# Patient Record
Sex: FEMALE | Race: WHITE | NOT HISPANIC OR LATINO | Employment: OTHER | URBAN - METROPOLITAN AREA
[De-identification: names, ages, dates, MRNs, and addresses within clinical notes are randomized per-mention and may not be internally consistent; named-entity substitution may affect disease eponyms.]

---

## 2020-10-02 LAB
CHOLESTEROL (EXTERNAL): 185 MG/DL
HDLC SERPL-MCNC: 61 MG/DL (ref 40–60)
NON HDL CHOLESTEROL (EXTERNAL): 124 MG/DL
TRIGLYCERIDES (EXTERNAL): 73 MG/DL

## 2021-03-25 LAB
ALT SERPL-CCNC: 23 U/L
AST SERPL-CCNC: 28 IU/L (ref 15–52)
CREATININE (EXTERNAL): 0.79 MG/DL (ref 0.52–1.04)
GFR ESTIMATED (EXTERNAL): >60 ML/MIN/1.73M2
GFR ESTIMATED (IF AFRICAN AMERICAN) (EXTERNAL): >60 ML/MIN/1.73M2
GLUCOSE (EXTERNAL): 110 MG/DL (ref 65–100)
HBA1C MFR BLD: 5.4 %
TSH SERPL-ACNC: 2.4 UIU/ML (ref 0.47–4.68)

## 2021-11-14 DIAGNOSIS — Z11.59 ENCOUNTER FOR SCREENING FOR OTHER VIRAL DISEASES: ICD-10-CM

## 2021-11-30 ENCOUNTER — TRANSFERRED RECORDS (OUTPATIENT)
Dept: MULTI SPECIALTY CLINIC | Facility: CLINIC | Age: 76
End: 2021-11-30
Payer: COMMERCIAL

## 2021-11-30 LAB — POTASSIUM (EXTERNAL): 4.1 MMOL/L (ref 3.5–5.1)

## 2021-12-05 ENCOUNTER — LAB (OUTPATIENT)
Dept: FAMILY MEDICINE | Facility: CLINIC | Age: 76
End: 2021-12-05
Attending: ORTHOPAEDIC SURGERY
Payer: COMMERCIAL

## 2021-12-05 DIAGNOSIS — Z11.59 ENCOUNTER FOR SCREENING FOR OTHER VIRAL DISEASES: ICD-10-CM

## 2021-12-05 PROCEDURE — U0003 INFECTIOUS AGENT DETECTION BY NUCLEIC ACID (DNA OR RNA); SEVERE ACUTE RESPIRATORY SYNDROME CORONAVIRUS 2 (SARS-COV-2) (CORONAVIRUS DISEASE [COVID-19]), AMPLIFIED PROBE TECHNIQUE, MAKING USE OF HIGH THROUGHPUT TECHNOLOGIES AS DESCRIBED BY CMS-2020-01-R: HCPCS

## 2021-12-05 PROCEDURE — U0005 INFEC AGEN DETEC AMPLI PROBE: HCPCS

## 2021-12-06 LAB — SARS-COV-2 RNA RESP QL NAA+PROBE: NEGATIVE

## 2021-12-07 RX ORDER — DULOXETIN HYDROCHLORIDE 30 MG/1
90 CAPSULE, DELAYED RELEASE ORAL DAILY
COMMUNITY

## 2021-12-07 RX ORDER — CARBIDOPA AND LEVODOPA 25; 100 MG/1; MG/1
1 TABLET ORAL 3 TIMES DAILY
COMMUNITY

## 2021-12-07 RX ORDER — NICOTINE POLACRILEX 4 MG/1
20 GUM, CHEWING ORAL DAILY
Status: ON HOLD | COMMUNITY
End: 2021-12-09

## 2021-12-07 RX ORDER — LOSARTAN POTASSIUM 25 MG/1
12.5 TABLET ORAL DAILY
COMMUNITY

## 2021-12-08 ENCOUNTER — ANESTHESIA EVENT (OUTPATIENT)
Dept: SURGERY | Facility: CLINIC | Age: 76
End: 2021-12-08
Payer: MEDICARE

## 2021-12-09 ENCOUNTER — ANESTHESIA (OUTPATIENT)
Dept: SURGERY | Facility: CLINIC | Age: 76
End: 2021-12-09
Payer: MEDICARE

## 2021-12-09 ENCOUNTER — APPOINTMENT (OUTPATIENT)
Dept: RADIOLOGY | Facility: CLINIC | Age: 76
End: 2021-12-09
Attending: ORTHOPAEDIC SURGERY
Payer: MEDICARE

## 2021-12-09 ENCOUNTER — HOSPITAL ENCOUNTER (OUTPATIENT)
Facility: CLINIC | Age: 76
Discharge: HOME | End: 2021-12-09
Attending: ORTHOPAEDIC SURGERY | Admitting: ORTHOPAEDIC SURGERY
Payer: MEDICARE

## 2021-12-09 VITALS
HEART RATE: 88 BPM | WEIGHT: 215.1 LBS | BODY MASS INDEX: 45.15 KG/M2 | OXYGEN SATURATION: 95 % | TEMPERATURE: 97.8 F | RESPIRATION RATE: 14 BRPM | SYSTOLIC BLOOD PRESSURE: 130 MMHG | HEIGHT: 58 IN | DIASTOLIC BLOOD PRESSURE: 58 MMHG

## 2021-12-09 DIAGNOSIS — Z98.890 STATUS POST LUMBAR SPINE OPERATION: Primary | ICD-10-CM

## 2021-12-09 LAB — GLUCOSE BLDC GLUCOMTR-MCNC: 93 MG/DL (ref 70–99)

## 2021-12-09 PROCEDURE — 710N000010 HC RECOVERY PHASE 1, LEVEL 2, PER MIN: Performed by: ORTHOPAEDIC SURGERY

## 2021-12-09 PROCEDURE — 250N000009 HC RX 250: Performed by: NURSE ANESTHETIST, CERTIFIED REGISTERED

## 2021-12-09 PROCEDURE — 250N000005 HC OR RX SURGIFLO HEMOSTATIC MATRIX 10ML 199102S OPNP: Performed by: ORTHOPAEDIC SURGERY

## 2021-12-09 PROCEDURE — 272N000001 HC OR GENERAL SUPPLY STERILE: Performed by: ORTHOPAEDIC SURGERY

## 2021-12-09 PROCEDURE — 360N000084 HC SURGERY LEVEL 4 W/ FLUORO, PER MIN: Performed by: ORTHOPAEDIC SURGERY

## 2021-12-09 PROCEDURE — 250N000013 HC RX MED GY IP 250 OP 250 PS 637: Performed by: ANESTHESIOLOGY

## 2021-12-09 PROCEDURE — 82962 GLUCOSE BLOOD TEST: CPT | Mod: GZ

## 2021-12-09 PROCEDURE — 250N000009 HC RX 250: Performed by: ORTHOPAEDIC SURGERY

## 2021-12-09 PROCEDURE — 710N000012 HC RECOVERY PHASE 2, PER MINUTE: Performed by: ORTHOPAEDIC SURGERY

## 2021-12-09 PROCEDURE — 272N000004 HC RX 272: Performed by: ORTHOPAEDIC SURGERY

## 2021-12-09 PROCEDURE — 250N000011 HC RX IP 250 OP 636: Performed by: NURSE ANESTHETIST, CERTIFIED REGISTERED

## 2021-12-09 PROCEDURE — 250N000011 HC RX IP 250 OP 636: Performed by: ORTHOPAEDIC SURGERY

## 2021-12-09 PROCEDURE — 250N000011 HC RX IP 250 OP 636: Performed by: ANESTHESIOLOGY

## 2021-12-09 PROCEDURE — 250N000013 HC RX MED GY IP 250 OP 250 PS 637: Performed by: ORTHOPAEDIC SURGERY

## 2021-12-09 PROCEDURE — 250N000025 HC SEVOFLURANE, PER MIN: Performed by: ORTHOPAEDIC SURGERY

## 2021-12-09 PROCEDURE — 258N000003 HC RX IP 258 OP 636: Performed by: NURSE ANESTHETIST, CERTIFIED REGISTERED

## 2021-12-09 PROCEDURE — 999N000141 HC STATISTIC PRE-PROCEDURE NURSING ASSESSMENT: Performed by: ORTHOPAEDIC SURGERY

## 2021-12-09 PROCEDURE — 370N000017 HC ANESTHESIA TECHNICAL FEE, PER MIN: Performed by: ORTHOPAEDIC SURGERY

## 2021-12-09 PROCEDURE — 258N000003 HC RX IP 258 OP 636: Performed by: ANESTHESIOLOGY

## 2021-12-09 PROCEDURE — 999N000182 XR SURGERY CARM FLUORO GREATER THAN 5 MIN: Mod: TC

## 2021-12-09 RX ORDER — PROPOFOL 10 MG/ML
INJECTION, EMULSION INTRAVENOUS PRN
Status: DISCONTINUED | OUTPATIENT
Start: 2021-12-09 | End: 2021-12-09

## 2021-12-09 RX ORDER — HYDROMORPHONE HCL IN WATER/PF 6 MG/30 ML
0.2 PATIENT CONTROLLED ANALGESIA SYRINGE INTRAVENOUS EVERY 5 MIN PRN
Status: DISCONTINUED | OUTPATIENT
Start: 2021-12-09 | End: 2021-12-09 | Stop reason: HOSPADM

## 2021-12-09 RX ORDER — CEFAZOLIN SODIUM 2 G/100ML
2 INJECTION, SOLUTION INTRAVENOUS SEE ADMIN INSTRUCTIONS
Status: DISCONTINUED | OUTPATIENT
Start: 2021-12-09 | End: 2021-12-09 | Stop reason: HOSPADM

## 2021-12-09 RX ORDER — ASPIRIN 81 MG/1
81 TABLET ORAL DAILY
Status: ON HOLD | COMMUNITY
End: 2021-12-09

## 2021-12-09 RX ORDER — ACETAMINOPHEN 325 MG/1
650 TABLET ORAL EVERY 4 HOURS PRN
Qty: 50 TABLET | Refills: 0 | Status: SHIPPED | OUTPATIENT
Start: 2021-12-09

## 2021-12-09 RX ORDER — ASPIRIN 81 MG/1
81 TABLET ORAL DAILY
COMMUNITY
Start: 2021-12-09 | End: 2023-02-14

## 2021-12-09 RX ORDER — BUPIVACAINE HYDROCHLORIDE AND EPINEPHRINE 2.5; 5 MG/ML; UG/ML
INJECTION, SOLUTION INFILTRATION; PERINEURAL PRN
Status: DISCONTINUED | OUTPATIENT
Start: 2021-12-09 | End: 2021-12-09 | Stop reason: HOSPADM

## 2021-12-09 RX ORDER — FENTANYL CITRATE 50 UG/ML
25 INJECTION, SOLUTION INTRAMUSCULAR; INTRAVENOUS
Status: DISCONTINUED | OUTPATIENT
Start: 2021-12-09 | End: 2021-12-09 | Stop reason: HOSPADM

## 2021-12-09 RX ORDER — ONDANSETRON 4 MG/1
4 TABLET, ORALLY DISINTEGRATING ORAL
Status: CANCELLED | OUTPATIENT
Start: 2021-12-09

## 2021-12-09 RX ORDER — ONDANSETRON 4 MG/1
4 TABLET, ORALLY DISINTEGRATING ORAL EVERY 30 MIN PRN
Status: DISCONTINUED | OUTPATIENT
Start: 2021-12-09 | End: 2021-12-09 | Stop reason: HOSPADM

## 2021-12-09 RX ORDER — GABAPENTIN 100 MG/1
100 CAPSULE ORAL
Status: COMPLETED | OUTPATIENT
Start: 2021-12-09 | End: 2021-12-09

## 2021-12-09 RX ORDER — MAGNESIUM SULFATE 4 G/50ML
4 INJECTION INTRAVENOUS ONCE
Status: COMPLETED | OUTPATIENT
Start: 2021-12-09 | End: 2021-12-09

## 2021-12-09 RX ORDER — LIDOCAINE 40 MG/G
CREAM TOPICAL
Status: DISCONTINUED | OUTPATIENT
Start: 2021-12-09 | End: 2021-12-09 | Stop reason: HOSPADM

## 2021-12-09 RX ORDER — MEPERIDINE HYDROCHLORIDE 50 MG/ML
12.5 INJECTION INTRAMUSCULAR; INTRAVENOUS; SUBCUTANEOUS
Status: DISCONTINUED | OUTPATIENT
Start: 2021-12-09 | End: 2021-12-09 | Stop reason: HOSPADM

## 2021-12-09 RX ORDER — SODIUM CHLORIDE, SODIUM LACTATE, POTASSIUM CHLORIDE, CALCIUM CHLORIDE 600; 310; 30; 20 MG/100ML; MG/100ML; MG/100ML; MG/100ML
INJECTION, SOLUTION INTRAVENOUS CONTINUOUS
Status: DISCONTINUED | OUTPATIENT
Start: 2021-12-09 | End: 2021-12-09 | Stop reason: HOSPADM

## 2021-12-09 RX ORDER — KETOCONAZOLE 20 MG/G
CREAM TOPICAL DAILY PRN
COMMUNITY
Start: 2021-07-01 | End: 2023-02-14

## 2021-12-09 RX ORDER — AMOXICILLIN 250 MG
1-2 CAPSULE ORAL 2 TIMES DAILY
Qty: 30 TABLET | Refills: 0 | Status: SHIPPED | OUTPATIENT
Start: 2021-12-09 | End: 2023-03-07

## 2021-12-09 RX ORDER — HYDROCODONE BITARTRATE AND ACETAMINOPHEN 5; 325 MG/1; MG/1
1 TABLET ORAL
Status: CANCELLED | OUTPATIENT
Start: 2021-12-09

## 2021-12-09 RX ORDER — HYDROCODONE BITARTRATE AND ACETAMINOPHEN 5; 325 MG/1; MG/1
1-2 TABLET ORAL EVERY 4 HOURS PRN
Qty: 30 TABLET | Refills: 0 | Status: SHIPPED | OUTPATIENT
Start: 2021-12-09 | End: 2023-02-14

## 2021-12-09 RX ORDER — MAGNESIUM HYDROXIDE 1200 MG/15ML
LIQUID ORAL PRN
Status: DISCONTINUED | OUTPATIENT
Start: 2021-12-09 | End: 2021-12-09 | Stop reason: HOSPADM

## 2021-12-09 RX ORDER — DEXAMETHASONE SODIUM PHOSPHATE 10 MG/ML
INJECTION, SOLUTION INTRAMUSCULAR; INTRAVENOUS PRN
Status: DISCONTINUED | OUTPATIENT
Start: 2021-12-09 | End: 2021-12-09

## 2021-12-09 RX ORDER — HYDROXYZINE HYDROCHLORIDE 10 MG/1
10 TABLET, FILM COATED ORAL 3 TIMES DAILY PRN
Qty: 20 TABLET | Refills: 0 | Status: SHIPPED | OUTPATIENT
Start: 2021-12-09 | End: 2023-02-14

## 2021-12-09 RX ORDER — FENTANYL CITRATE 50 UG/ML
INJECTION, SOLUTION INTRAMUSCULAR; INTRAVENOUS PRN
Status: DISCONTINUED | OUTPATIENT
Start: 2021-12-09 | End: 2021-12-09

## 2021-12-09 RX ORDER — ONDANSETRON 2 MG/ML
INJECTION INTRAMUSCULAR; INTRAVENOUS PRN
Status: DISCONTINUED | OUTPATIENT
Start: 2021-12-09 | End: 2021-12-09

## 2021-12-09 RX ORDER — FENTANYL CITRATE 50 UG/ML
25 INJECTION, SOLUTION INTRAMUSCULAR; INTRAVENOUS EVERY 5 MIN PRN
Status: DISCONTINUED | OUTPATIENT
Start: 2021-12-09 | End: 2021-12-09 | Stop reason: HOSPADM

## 2021-12-09 RX ORDER — LIDOCAINE HYDROCHLORIDE 10 MG/ML
INJECTION, SOLUTION INFILTRATION; PERINEURAL PRN
Status: DISCONTINUED | OUTPATIENT
Start: 2021-12-09 | End: 2021-12-09

## 2021-12-09 RX ORDER — ONDANSETRON 4 MG/1
4-8 TABLET, ORALLY DISINTEGRATING ORAL EVERY 8 HOURS PRN
Qty: 20 TABLET | Refills: 0 | Status: ON HOLD | OUTPATIENT
Start: 2021-12-09 | End: 2023-03-14

## 2021-12-09 RX ORDER — ONDANSETRON 2 MG/ML
4 INJECTION INTRAMUSCULAR; INTRAVENOUS EVERY 30 MIN PRN
Status: DISCONTINUED | OUTPATIENT
Start: 2021-12-09 | End: 2021-12-09 | Stop reason: HOSPADM

## 2021-12-09 RX ORDER — OXYCODONE HYDROCHLORIDE 5 MG/1
5 TABLET ORAL EVERY 4 HOURS PRN
Status: DISCONTINUED | OUTPATIENT
Start: 2021-12-09 | End: 2021-12-09 | Stop reason: HOSPADM

## 2021-12-09 RX ORDER — ACETAMINOPHEN 325 MG/1
650 TABLET ORAL
Status: CANCELLED | OUTPATIENT
Start: 2021-12-09

## 2021-12-09 RX ORDER — CEFAZOLIN SODIUM 2 G/100ML
2 INJECTION, SOLUTION INTRAVENOUS
Status: COMPLETED | OUTPATIENT
Start: 2021-12-09 | End: 2021-12-09

## 2021-12-09 RX ORDER — HYDROXYZINE HYDROCHLORIDE 10 MG/1
10 TABLET, FILM COATED ORAL
Status: CANCELLED | OUTPATIENT
Start: 2021-12-09

## 2021-12-09 RX ADMIN — CEFAZOLIN SODIUM 2 G: 2 INJECTION, SOLUTION INTRAVENOUS at 14:12

## 2021-12-09 RX ADMIN — FENTANYL CITRATE 100 MCG: 50 INJECTION, SOLUTION INTRAMUSCULAR; INTRAVENOUS at 14:12

## 2021-12-09 RX ADMIN — PROPOFOL 150 MG: 10 INJECTION, EMULSION INTRAVENOUS at 14:12

## 2021-12-09 RX ADMIN — PHENYLEPHRINE HYDROCHLORIDE 100 MCG: 10 INJECTION INTRAVENOUS at 14:49

## 2021-12-09 RX ADMIN — HYDROMORPHONE HYDROCHLORIDE 0.5 MG: 1 INJECTION, SOLUTION INTRAMUSCULAR; INTRAVENOUS; SUBCUTANEOUS at 14:43

## 2021-12-09 RX ADMIN — ROCURONIUM BROMIDE 50 MG: 10 INJECTION, SOLUTION INTRAVENOUS at 14:12

## 2021-12-09 RX ADMIN — FENTANYL CITRATE 25 MCG: 50 INJECTION INTRAMUSCULAR; INTRAVENOUS at 16:15

## 2021-12-09 RX ADMIN — GABAPENTIN 100 MG: 100 CAPSULE ORAL at 12:34

## 2021-12-09 RX ADMIN — FENTANYL CITRATE 25 MCG: 50 INJECTION INTRAMUSCULAR; INTRAVENOUS at 16:30

## 2021-12-09 RX ADMIN — DEXAMETHASONE SODIUM PHOSPHATE 10 MG: 10 INJECTION, SOLUTION INTRAMUSCULAR; INTRAVENOUS at 14:28

## 2021-12-09 RX ADMIN — LIDOCAINE HYDROCHLORIDE 3 ML: 10 INJECTION, SOLUTION INFILTRATION; PERINEURAL at 14:12

## 2021-12-09 RX ADMIN — FENTANYL CITRATE 25 MCG: 50 INJECTION INTRAMUSCULAR; INTRAVENOUS at 16:40

## 2021-12-09 RX ADMIN — MAGNESIUM SULFATE HEPTAHYDRATE 4 G: 80 INJECTION, SOLUTION INTRAVENOUS at 12:45

## 2021-12-09 RX ADMIN — FENTANYL CITRATE 25 MCG: 50 INJECTION INTRAMUSCULAR; INTRAVENOUS at 16:20

## 2021-12-09 RX ADMIN — SODIUM CHLORIDE, POTASSIUM CHLORIDE, SODIUM LACTATE AND CALCIUM CHLORIDE: 600; 310; 30; 20 INJECTION, SOLUTION INTRAVENOUS at 12:34

## 2021-12-09 RX ADMIN — PHENYLEPHRINE HYDROCHLORIDE 100 MCG: 10 INJECTION INTRAVENOUS at 15:03

## 2021-12-09 RX ADMIN — PHENYLEPHRINE HYDROCHLORIDE 100 MCG: 10 INJECTION INTRAVENOUS at 15:24

## 2021-12-09 RX ADMIN — OXYCODONE HYDROCHLORIDE 5 MG: 5 TABLET ORAL at 17:08

## 2021-12-09 RX ADMIN — SUGAMMADEX 200 MG: 100 INJECTION, SOLUTION INTRAVENOUS at 15:53

## 2021-12-09 RX ADMIN — PHENYLEPHRINE HYDROCHLORIDE 100 MCG: 10 INJECTION INTRAVENOUS at 15:12

## 2021-12-09 RX ADMIN — MIDAZOLAM 2 MG: 1 INJECTION INTRAMUSCULAR; INTRAVENOUS at 14:09

## 2021-12-09 RX ADMIN — ONDANSETRON 4 MG: 2 INJECTION INTRAMUSCULAR; INTRAVENOUS at 14:28

## 2021-12-09 ASSESSMENT — MIFFLIN-ST. JEOR: SCORE: 1355.44

## 2021-12-09 NOTE — ANESTHESIA PREPROCEDURE EVALUATION
Anesthesia Pre-Procedure Evaluation    Patient: Angela Martinez   MRN: [id number] : 1945        Preoperative Diagnosis: Neurogenic claudication (H) [G95.19]  Spinal stenosis, lumbar [M48.061]    Procedure : Procedure(s):  LUMBAR 3-5 BILATERAL MEDIAL FACETECTOMY AND DECOMPRESSION          Past Medical History:   Diagnosis Date     Arthritis      Gastroesophageal reflux disease      Hypertension      Other chronic pain      Parkinson disease (H)     pt is being ruled out for parkinson's     Parkinsons disease (H)       Past Surgical History:   Procedure Laterality Date     BIOPSY       CHOLECYSTECTOMY       EYE SURGERY       ORTHOPEDIC SURGERY       TONSILLECTOMY        Allergies   Allergen Reactions     Lisinopril Cough     Oxycodone GI Disturbance     Penicillins Hives     Pneumovax [Pneumococcal Polysaccharides] Swelling     Tetanus Toxoid Swelling      Social History     Tobacco Use     Smoking status: Never Smoker     Smokeless tobacco: Never Used   Substance Use Topics     Alcohol use: Not Currently      Wt Readings from Last 1 Encounters:   21 97.6 kg (215 lb 1.6 oz)        Anesthesia Evaluation   Pt has had prior anesthetic.     No history of anesthetic complications       ROS/MED HX  ENT/Pulmonary:  - neg pulmonary ROS     Neurologic:     (+) Parkinson's disease,     Cardiovascular:     (+) hypertension-----    METS/Exercise Tolerance:     Hematologic:       Musculoskeletal:   (+) arthritis,     GI/Hepatic:     (+) GERD, Asymptomatic on medication,     Renal/Genitourinary:  - neg Renal ROS     Endo:     (+) Obesity,     Psychiatric/Substance Use:  - neg psychiatric ROS     Infectious Disease:       Malignancy:  - neg malignancy ROS     Other:      (+) , H/O Chronic Pain,        Physical Exam    Airway        Mallampati: I    Neck ROM: full     Respiratory Devices and Support         Dental           Cardiovascular   cardiovascular exam normal       Rhythm and rate: regular and normal      Pulmonary   pulmonary exam normal        breath sounds clear to auscultation           OUTSIDE LABS:  CBC: No results found for: WBC, HGB, HCT, PLT  BMP: No results found for: NA, POTASSIUM, CHLORIDE, CO2, BUN, CR, GLC  COAGS: No results found for: PTT, INR, FIBR  POC: No results found for: BGM, HCG, HCGS  HEPATIC: No results found for: ALBUMIN, PROTTOTAL, ALT, AST, GGT, ALKPHOS, BILITOTAL, BILIDIRECT, FREYA  OTHER: No results found for: PH, LACT, A1C, RAUL, PHOS, MAG, LIPASE, AMYLASE, TSH, T4, T3, CRP, SED    Anesthesia Plan    ASA Status:  3      Anesthesia Type: General.     - Airway: ETT   Induction: Intravenous.   Maintenance: Balanced.        Consents    Anesthesia Plan(s) and associated risks, benefits, and realistic alternatives discussed. Questions answered and patient/representative(s) expressed understanding.    - Discussed:     - Discussed with:  Patient         Postoperative Care       PONV prophylaxis: Ondansetron (or other 5HT-3), Dexamethasone or Solumedrol     Comments:                Alexis Wren MD

## 2021-12-09 NOTE — ANESTHESIA PROCEDURE NOTES
Airway       Patient location during procedure: OR       Procedure Start/Stop Times: 12/9/2021 2:16 PM  Staff -        CRNA: Rocky Harrison APRN CRNA       Performed By: CRNAIndications and Patient Condition         Mask difficulty assessment: 1 - vent by mask    Final Airway Details       Final airway type: endotracheal airway       Successful airway: ETT - single  Endotracheal Airway Details        ETT size (mm): 7.0       Cuffed: yes       Successful intubation technique: direct laryngoscopy       DL Blade Type: MAC 3       Grade View of Cords: 1       Adjucts: stylet and tooth guard       Position: Right       Measured from: lips    Post intubation assessment        Placement verified by: capnometry, equal breath sounds and chest rise        Number of attempts at approach: 1       Ease of procedure: easy       Dentition: Intact and Unchanged

## 2021-12-09 NOTE — PROCEDURES
December 9, 2021     Attending Surgeon: Jef Gan MD     Assistant: Sophia Mederos PA-C.  The assistance of Sophia Stout PA-C was critically important in the  Performance  the procedure including positioning, soft tissue retraction and safe performance of decompression.  This would not of been possible with a scrub tech.     Preoperative diagnosis: L3-4,  L4-5 spinal stenosis with neurogenic claudication     Postoperative diagnosis: L3-4, L4-5 spinal stenosis with neurogenic claudication     Procedure performed: L3-4, and L4-5 bilateral medial facetectomy and decompression      Estimated blood loss: 10 ml     Specimens: None     Complications: None apparent     Details of procedure:     Patient was seen in clinic with chronic low back pain and bilateral thigh pain.  She had exhausted nonsurgical measures and therefore I discussed with her the option of surgical intervention and specifically discussed risks including but not limited to death, infection, dural tear, and nonresolution of symptoms among others, patient accepted and wished to proceed     Patient was brought to the operating room and placed under general orotracheal anesthetic without complications.  Intravenous antibiotics were given within 1 hour incision.  Patient was then placed prone on the Roscoe table ensuring that all nerve areas and bony areas were well padded and free of pressure.  Low back region prepped and draped in routine sterile manner.  After the appropriate timeout,  I placed a marking needle into the skin and subcutaneous tissues and took intraoperative imaging to plan the incision.  Thereafter, I made a midline incision overlying the L3, L4 and L5 spinous processes.  I then extended the dissection down onto the fascia and subperiosteally on the left side to expose the  intralaminar spaces.  Self retaining retractors were placed.  I then took intraoperative imaging which confirmed that I was at the correct level.  I then used a  bur to bur down the medial descending facet of L3 and utilized Kerrison rongeurs to perform a laminotomy and medial facetectomy all the way to the  medial border of the left L3 and L4 pedicles,  performing a decompression.  I then, through the same laminotomy, was able to perform a right-sided L3-4 medial facetectomy and decompression.    I then went 1 level distal which was the L4-5 level and again performed the same procedure of an L3-4 laminotomy, bilateral medial facetectomy and decompression.  At the end of the procedure, the canal was capacious with no impingement.  I then performed copious irrigation and  hemostasis.  I placed a subfascial drain followed by closure of the fascia using  Vicryl - 0.  Vicryl 2-0 was used for the subcutaneous tissues and Vicryl 3 0 for the skin.  Half percent Marcaine with epinephrine was then injected into the skin and subcutaneous tissues.  Steri-Strips were applied followed by sterile dressing.  Patient was  then placed supine in her bed and extubated without  complications and brought to recovery room in satisfactory condition     Postoperative plan: Patient will be sent home today with oxycodone for pain.  After 2 days, I have instructed them to come back to [facility] either in [place] or [place], for drain removal.

## 2021-12-09 NOTE — ANESTHESIA CARE TRANSFER NOTE
Patient: Angela Martinez    Procedure: Procedure(s):  LUMBAR 3-5 BILATERAL MEDIAL FACETECTOMY AND DECOMPRESSION       Diagnosis: Neurogenic claudication (H) [G95.19]  Spinal stenosis, lumbar [M48.061]  Diagnosis Additional Information: No value filed.    Anesthesia Type:   General     Note:    Oropharynx: oropharynx clear of all foreign objects  Level of Consciousness: drowsy  Oxygen Supplementation: face mask  Level of Supplemental Oxygen (L/min / FiO2): 8  Independent Airway: airway patency satisfactory and stable  Dentition: dentition unchanged  Vital Signs Stable: post-procedure vital signs reviewed and stable  Report to RN Given: handoff report given  Patient transferred to: PACU    Handoff Report: Identifed the Patient, Identified the Reponsible Provider, Reviewed the pertinent medical history, Discussed the surgical course, Reviewed Intra-OP anesthesia mangement and issues during anesthesia, Set expectations for post-procedure period and Allowed opportunity for questions and acknowledgement of understanding      Vitals:  Vitals Value Taken Time   /88 12/09/21 1605   Temp 36.6  C (97.8  F) 12/09/21 1604   Pulse 84 12/09/21 1605   Resp 16    SpO2 100 % 12/09/21 1605   Vitals shown include unvalidated device data.    Electronically Signed By: SIDNEY Newell CRNA  December 9, 2021  4:07 PM

## 2021-12-09 NOTE — PHARMACY-ADMISSION MEDICATION HISTORY
Pharmacy Note - Admission Medication History    Pertinent Provider Information: N/A   ______________________________________________________________________    Prior To Admission (PTA) med list completed and updated in EMR.       PTA Med List   Medication Sig Last Dose     aspirin 81 MG EC tablet Take 81 mg by mouth daily 11/25/2021     carbidopa-levodopa (SINEMET)  MG tablet Take 1 tablet by mouth 3 times daily 12/8/2021 at PM     DULoxetine (CYMBALTA) 30 MG capsule Take 90 mg by mouth daily  12/8/2021 at AM     ketoconazole (NIZORAL) 2 % external cream Apply topically daily as needed Apply to eyebrows. Unknown at Unknown time     losartan (COZAAR) 25 MG tablet Take 12.5 mg by mouth daily 12/8/2021 at AM     metroNIDAZOLE (METROCREAM) 0.75 % external cream Apply topically 2 times daily as needed Apply to face. Unknown at Unknown time     omeprazole (PRILOSEC) 20 MG DR capsule Take 20 mg by mouth daily 12/8/2021 at AM       Information source(s): Patient and Clinic records    Method of interview communication: in-person    Patient was asked about OTC/herbal products specifically.  PTA med list reflects this.    Based on the pharmacist's assessment, the PTA med list information appears reliable    Allergies were reviewed, assessed, and updated with the patient.      Patient does not use any multi-dose medications prior to admission.     Thank you for the opportunity to participate in the care of this patient.      Reno Sánchez [facility]     12/9/2021     12:16 PM

## 2021-12-10 NOTE — ANESTHESIA POSTPROCEDURE EVALUATION
Patient: Angela Martinez    Procedure: Procedure(s):  LUMBAR 3-5 BILATERAL MEDIAL FACETECTOMY AND DECOMPRESSION       Diagnosis:Neurogenic claudication (H) [G95.19]  Spinal stenosis, lumbar [M48.061]  Diagnosis Additional Information: No value filed.    Anesthesia Type:  General    Note:  Disposition: Outpatient   Postop Pain Control: Uneventful            Sign Out: Well controlled pain   PONV: No   Neuro/Psych: Uneventful            Sign Out: Acceptable/Baseline neuro status   Airway/Respiratory: Uneventful            Sign Out: Acceptable/Baseline resp. status   CV/Hemodynamics: Uneventful            Sign Out: Acceptable CV status; No obvious hypovolemia; No obvious fluid overload   Other NRE: NONE   DID A NON-ROUTINE EVENT OCCUR? No           Last vitals:  Vitals Value Taken Time   /57 12/09/21 1650   Temp 36.6  C (97.8  F) 12/09/21 1604   Pulse 85 12/09/21 1653   Resp 13 12/09/21 1652   SpO2 93 % 12/09/21 1653   Vitals shown include unvalidated device data.    Electronically Signed By: Alberta Sow MD  December 10, 2021  2:03 PM

## 2023-02-14 RX ORDER — LATANOPROST 50 UG/ML
1 SOLUTION/ DROPS OPHTHALMIC AT BEDTIME
COMMUNITY
Start: 2022-02-24

## 2023-02-14 RX ORDER — LIRAGLUTIDE 6 MG/ML
0.6 INJECTION, SOLUTION SUBCUTANEOUS DAILY
COMMUNITY
Start: 2022-12-20

## 2023-02-14 RX ORDER — FERROUS SULFATE 325(65) MG
1 TABLET, DELAYED RELEASE (ENTERIC COATED) ORAL DAILY
COMMUNITY
Start: 2023-01-18

## 2023-02-14 RX ORDER — BUPROPION HYDROCHLORIDE 150 MG/1
1 TABLET ORAL DAILY
COMMUNITY
Start: 2022-12-13

## 2023-03-07 NOTE — PHARMACY-ADMISSION MEDICATION HISTORY
Pharmacy reviewed prior to admission med list from pre-admitting rn, ZACHARIAH Reece.      Prior to Admission medications    Medication Sig Last Dose Taking? Auth Provider Long Term End Date   acetaminophen (TYLENOL) 325 MG tablet Take 2 tablets (650 mg) by mouth every 4 hours as needed for mild pain  Yes Sophia Julio PA-C     buPROPion (WELLBUTRIN XL) 150 MG 24 hr tablet Take 1 tablet by mouth daily  Yes Reported, Patient Yes    carbidopa-levodopa (SINEMET)  MG tablet Take 1 tablet by mouth 3 times daily  Yes Reported, Patient Yes    DULoxetine (CYMBALTA) 30 MG capsule Take 90 mg by mouth daily   Yes Reported, Patient Yes    ferrous sulfate (FE TABS) 325 (65 Fe) MG EC tablet Take 1 tablet by mouth daily  Yes Reported, Patient     latanoprost (XALATAN) 0.005 % ophthalmic solution Place 1 drop into both eyes At Bedtime  Yes Reported, Patient Yes    liraglutide - Weight Management (SAXENDA) 18 MG/3ML pen Inject 0.6 mg Subcutaneous daily  Yes Reported, Patient     losartan (COZAAR) 25 MG tablet Take 12.5 mg by mouth daily  Yes Reported, Patient Yes    omeprazole (PRILOSEC) 20 MG DR capsule Take 20 mg by mouth daily  Yes Reported, Patient     ondansetron (ZOFRAN-ODT) 4 MG ODT tab Take 1-2 tablets (4-8 mg) by mouth every 8 hours as needed for nausea  Yes Sophia Julio PA-C     metroNIDAZOLE (METROCREAM) 0.75 % external cream Apply topically 2 times daily as needed Apply to face.   Unknown, Entered By History

## 2023-03-09 ENCOUNTER — ANESTHESIA EVENT (OUTPATIENT)
Dept: SURGERY | Facility: CLINIC | Age: 78
DRG: 454 | End: 2023-03-09
Payer: MEDICARE

## 2023-03-09 ENCOUNTER — ANESTHESIA (OUTPATIENT)
Dept: SURGERY | Facility: CLINIC | Age: 78
DRG: 454 | End: 2023-03-09
Payer: MEDICARE

## 2023-03-09 ENCOUNTER — APPOINTMENT (OUTPATIENT)
Dept: GENERAL RADIOLOGY | Facility: CLINIC | Age: 78
DRG: 454 | End: 2023-03-09
Attending: ORTHOPAEDIC SURGERY
Payer: MEDICARE

## 2023-03-09 ENCOUNTER — HOSPITAL ENCOUNTER (INPATIENT)
Facility: CLINIC | Age: 78
LOS: 6 days | Discharge: NURSING, REHAB OR LONG-TERM CARE | DRG: 454 | End: 2023-03-15
Attending: ORTHOPAEDIC SURGERY | Admitting: ORTHOPAEDIC SURGERY
Payer: MEDICARE

## 2023-03-09 DIAGNOSIS — Z98.890 STATUS POST LUMBAR SPINE OPERATION: ICD-10-CM

## 2023-03-09 DIAGNOSIS — M43.26 FUSION OF SPINE, LUMBAR REGION: ICD-10-CM

## 2023-03-09 DIAGNOSIS — G89.18 ACUTE POST-OPERATIVE PAIN: Primary | ICD-10-CM

## 2023-03-09 LAB
ABO/RH(D): NORMAL
ANTIBODY SCREEN: NEGATIVE
HGB BLD-MCNC: 11.8 G/DL (ref 11.7–15.7)
SPECIMEN EXPIRATION DATE: NORMAL

## 2023-03-09 PROCEDURE — 258N000003 HC RX IP 258 OP 636

## 2023-03-09 PROCEDURE — C1763 CONN TISS, NON-HUMAN: HCPCS | Performed by: ORTHOPAEDIC SURGERY

## 2023-03-09 PROCEDURE — 370N000017 HC ANESTHESIA TECHNICAL FEE, PER MIN: Performed by: ORTHOPAEDIC SURGERY

## 2023-03-09 PROCEDURE — 0SG1371 FUSION OF 2 OR MORE LUMBAR VERTEBRAL JOINTS WITH AUTOLOGOUS TISSUE SUBSTITUTE, POSTERIOR APPROACH, POSTERIOR COLUMN, PERCUTANEOUS APPROACH: ICD-10-PCS | Performed by: ORTHOPAEDIC SURGERY

## 2023-03-09 PROCEDURE — 250N000009 HC RX 250

## 2023-03-09 PROCEDURE — 250N000011 HC RX IP 250 OP 636: Performed by: ANESTHESIOLOGY

## 2023-03-09 PROCEDURE — 272N000001 HC OR GENERAL SUPPLY STERILE: Performed by: ORTHOPAEDIC SURGERY

## 2023-03-09 PROCEDURE — 250N000013 HC RX MED GY IP 250 OP 250 PS 637: Performed by: NURSE PRACTITIONER

## 2023-03-09 PROCEDURE — 86850 RBC ANTIBODY SCREEN: CPT | Performed by: ORTHOPAEDIC SURGERY

## 2023-03-09 PROCEDURE — 250N000011 HC RX IP 250 OP 636: Performed by: ORTHOPAEDIC SURGERY

## 2023-03-09 PROCEDURE — C1713 ANCHOR/SCREW BN/BN,TIS/BN: HCPCS | Performed by: ORTHOPAEDIC SURGERY

## 2023-03-09 PROCEDURE — 250N000013 HC RX MED GY IP 250 OP 250 PS 637

## 2023-03-09 PROCEDURE — 99223 1ST HOSP IP/OBS HIGH 75: CPT | Performed by: NURSE PRACTITIONER

## 2023-03-09 PROCEDURE — 272N000002 HC OR SUPPLY OTHER OPNP: Performed by: ORTHOPAEDIC SURGERY

## 2023-03-09 PROCEDURE — 999N000179 XR SURGERY CARM FLUORO LESS THAN 5 MIN W STILLS: Mod: TC

## 2023-03-09 PROCEDURE — 120N000001 HC R&B MED SURG/OB

## 2023-03-09 PROCEDURE — 360N000084 HC SURGERY LEVEL 4 W/ FLUORO, PER MIN: Performed by: ORTHOPAEDIC SURGERY

## 2023-03-09 PROCEDURE — 250N000025 HC SEVOFLURANE, PER MIN: Performed by: ORTHOPAEDIC SURGERY

## 2023-03-09 PROCEDURE — 250N000011 HC RX IP 250 OP 636

## 2023-03-09 PROCEDURE — 99222 1ST HOSP IP/OBS MODERATE 55: CPT | Performed by: PHYSICIAN ASSISTANT

## 2023-03-09 PROCEDURE — 85018 HEMOGLOBIN: CPT | Performed by: ORTHOPAEDIC SURGERY

## 2023-03-09 PROCEDURE — 0SB23ZZ EXCISION OF LUMBAR VERTEBRAL DISC, PERCUTANEOUS APPROACH: ICD-10-PCS | Performed by: ORTHOPAEDIC SURGERY

## 2023-03-09 PROCEDURE — 999N000141 HC STATISTIC PRE-PROCEDURE NURSING ASSESSMENT: Performed by: ORTHOPAEDIC SURGERY

## 2023-03-09 PROCEDURE — 4A11X4G MONITORING OF PERIPHERAL NERVOUS ELECTRICAL ACTIVITY, INTRAOPERATIVE, EXTERNAL APPROACH: ICD-10-PCS | Performed by: ORTHOPAEDIC SURGERY

## 2023-03-09 PROCEDURE — 258N000003 HC RX IP 258 OP 636: Performed by: ANESTHESIOLOGY

## 2023-03-09 PROCEDURE — 272N000683 HC OR SPINE - CAGE/SPACER/DISK/CORD/CONNECTOR OPNP: Performed by: ORTHOPAEDIC SURGERY

## 2023-03-09 PROCEDURE — 250N000013 HC RX MED GY IP 250 OP 250 PS 637: Performed by: ORTHOPAEDIC SURGERY

## 2023-03-09 PROCEDURE — 710N000010 HC RECOVERY PHASE 1, LEVEL 2, PER MIN: Performed by: ORTHOPAEDIC SURGERY

## 2023-03-09 PROCEDURE — 0SG13AJ FUSION OF 2 OR MORE LUMBAR VERTEBRAL JOINTS WITH INTERBODY FUSION DEVICE, POSTERIOR APPROACH, ANTERIOR COLUMN, PERCUTANEOUS APPROACH: ICD-10-PCS | Performed by: ORTHOPAEDIC SURGERY

## 2023-03-09 PROCEDURE — 36415 COLL VENOUS BLD VENIPUNCTURE: CPT | Performed by: ORTHOPAEDIC SURGERY

## 2023-03-09 PROCEDURE — 272N000282 HC OR IOM SUPPLIES OPNP: Performed by: ORTHOPAEDIC SURGERY

## 2023-03-09 PROCEDURE — 250N000013 HC RX MED GY IP 250 OP 250 PS 637: Performed by: ANESTHESIOLOGY

## 2023-03-09 DEVICE — SCREW SET SPNE STAR OPN LNK PATHLOC-L STRL LF: Type: IMPLANTABLE DEVICE | Site: SPINE LUMBAR | Status: FUNCTIONAL

## 2023-03-09 RX ORDER — NALOXONE HYDROCHLORIDE 0.4 MG/ML
0.2 INJECTION, SOLUTION INTRAMUSCULAR; INTRAVENOUS; SUBCUTANEOUS
Status: DISCONTINUED | OUTPATIENT
Start: 2023-03-09 | End: 2023-03-15 | Stop reason: HOSPADM

## 2023-03-09 RX ORDER — PROPOFOL 10 MG/ML
INJECTION, EMULSION INTRAVENOUS PRN
Status: DISCONTINUED | OUTPATIENT
Start: 2023-03-09 | End: 2023-03-09

## 2023-03-09 RX ORDER — ONDANSETRON 4 MG/1
4 TABLET, ORALLY DISINTEGRATING ORAL EVERY 30 MIN PRN
Status: DISCONTINUED | OUTPATIENT
Start: 2023-03-09 | End: 2023-03-09

## 2023-03-09 RX ORDER — ONDANSETRON 2 MG/ML
INJECTION INTRAMUSCULAR; INTRAVENOUS PRN
Status: DISCONTINUED | OUTPATIENT
Start: 2023-03-09 | End: 2023-03-09

## 2023-03-09 RX ORDER — OXYCODONE HYDROCHLORIDE 5 MG/1
TABLET ORAL
Qty: 40 TABLET | Refills: 0 | Status: SHIPPED | OUTPATIENT
Start: 2023-03-09

## 2023-03-09 RX ORDER — TRANEXAMIC ACID 10 MG/ML
5 INJECTION, SOLUTION INTRAVENOUS CONTINUOUS
Status: DISCONTINUED | OUTPATIENT
Start: 2023-03-09 | End: 2023-03-09 | Stop reason: HOSPADM

## 2023-03-09 RX ORDER — NALOXONE HYDROCHLORIDE 0.4 MG/ML
0.4 INJECTION, SOLUTION INTRAMUSCULAR; INTRAVENOUS; SUBCUTANEOUS
Status: DISCONTINUED | OUTPATIENT
Start: 2023-03-09 | End: 2023-03-15 | Stop reason: HOSPADM

## 2023-03-09 RX ORDER — BUPIVACAINE HYDROCHLORIDE 2.5 MG/ML
INJECTION, SOLUTION EPIDURAL; INFILTRATION; INTRACAUDAL PRN
Status: DISCONTINUED | OUTPATIENT
Start: 2023-03-09 | End: 2023-03-09 | Stop reason: HOSPADM

## 2023-03-09 RX ORDER — ONDANSETRON 2 MG/ML
4 INJECTION INTRAMUSCULAR; INTRAVENOUS EVERY 6 HOURS PRN
Status: DISCONTINUED | OUTPATIENT
Start: 2023-03-09 | End: 2023-03-15 | Stop reason: HOSPADM

## 2023-03-09 RX ORDER — KETOROLAC TROMETHAMINE 15 MG/ML
15 INJECTION, SOLUTION INTRAMUSCULAR; INTRAVENOUS EVERY 6 HOURS
Status: COMPLETED | OUTPATIENT
Start: 2023-03-09 | End: 2023-03-10

## 2023-03-09 RX ORDER — CEFAZOLIN SODIUM 2 G/100ML
2 INJECTION, SOLUTION INTRAVENOUS EVERY 8 HOURS
Status: COMPLETED | OUTPATIENT
Start: 2023-03-09 | End: 2023-03-10

## 2023-03-09 RX ORDER — POLYETHYLENE GLYCOL 3350 17 G/17G
17 POWDER, FOR SOLUTION ORAL DAILY
Status: DISCONTINUED | OUTPATIENT
Start: 2023-03-10 | End: 2023-03-15 | Stop reason: HOSPADM

## 2023-03-09 RX ORDER — ACETAMINOPHEN 325 MG/1
975 TABLET ORAL ONCE
Status: COMPLETED | OUTPATIENT
Start: 2023-03-09 | End: 2023-03-09

## 2023-03-09 RX ORDER — LIDOCAINE 40 MG/G
CREAM TOPICAL
Status: DISCONTINUED | OUTPATIENT
Start: 2023-03-09 | End: 2023-03-09 | Stop reason: HOSPADM

## 2023-03-09 RX ORDER — PROPOFOL 10 MG/ML
INJECTION, EMULSION INTRAVENOUS CONTINUOUS PRN
Status: DISCONTINUED | OUTPATIENT
Start: 2023-03-09 | End: 2023-03-09

## 2023-03-09 RX ORDER — SODIUM CHLORIDE, SODIUM LACTATE, POTASSIUM CHLORIDE, CALCIUM CHLORIDE 600; 310; 30; 20 MG/100ML; MG/100ML; MG/100ML; MG/100ML
INJECTION, SOLUTION INTRAVENOUS CONTINUOUS
Status: DISCONTINUED | OUTPATIENT
Start: 2023-03-09 | End: 2023-03-09 | Stop reason: HOSPADM

## 2023-03-09 RX ORDER — FENTANYL CITRATE 50 UG/ML
25 INJECTION, SOLUTION INTRAMUSCULAR; INTRAVENOUS EVERY 5 MIN PRN
Status: DISCONTINUED | OUTPATIENT
Start: 2023-03-09 | End: 2023-03-09

## 2023-03-09 RX ORDER — LATANOPROST 50 UG/ML
1 SOLUTION/ DROPS OPHTHALMIC AT BEDTIME
Status: DISCONTINUED | OUTPATIENT
Start: 2023-03-09 | End: 2023-03-15 | Stop reason: HOSPADM

## 2023-03-09 RX ORDER — AMOXICILLIN 250 MG
2-3 CAPSULE ORAL 2 TIMES DAILY
Status: DISCONTINUED | OUTPATIENT
Start: 2023-03-09 | End: 2023-03-15 | Stop reason: HOSPADM

## 2023-03-09 RX ORDER — SODIUM CHLORIDE 9 MG/ML
INJECTION, SOLUTION INTRAVENOUS CONTINUOUS
Status: DISCONTINUED | OUTPATIENT
Start: 2023-03-09 | End: 2023-03-10

## 2023-03-09 RX ORDER — OXYCODONE HYDROCHLORIDE 5 MG/1
5 TABLET ORAL EVERY 4 HOURS PRN
Status: DISCONTINUED | OUTPATIENT
Start: 2023-03-09 | End: 2023-03-09

## 2023-03-09 RX ORDER — HYDRALAZINE HYDROCHLORIDE 20 MG/ML
2.5-5 INJECTION INTRAMUSCULAR; INTRAVENOUS EVERY 10 MIN PRN
Status: DISCONTINUED | OUTPATIENT
Start: 2023-03-09 | End: 2023-03-09

## 2023-03-09 RX ORDER — AMOXICILLIN 250 MG
1 CAPSULE ORAL 2 TIMES DAILY
Status: DISCONTINUED | OUTPATIENT
Start: 2023-03-09 | End: 2023-03-09

## 2023-03-09 RX ORDER — ACETAMINOPHEN 325 MG/1
975 TABLET ORAL EVERY 8 HOURS
Status: COMPLETED | OUTPATIENT
Start: 2023-03-09 | End: 2023-03-12

## 2023-03-09 RX ORDER — GABAPENTIN 100 MG/1
100 CAPSULE ORAL
Status: COMPLETED | OUTPATIENT
Start: 2023-03-09 | End: 2023-03-09

## 2023-03-09 RX ORDER — OXYCODONE HYDROCHLORIDE 5 MG/1
5 TABLET ORAL
Status: DISCONTINUED | OUTPATIENT
Start: 2023-03-09 | End: 2023-03-15 | Stop reason: HOSPADM

## 2023-03-09 RX ORDER — PANTOPRAZOLE SODIUM 40 MG/1
40 TABLET, DELAYED RELEASE ORAL DAILY
Status: DISCONTINUED | OUTPATIENT
Start: 2023-03-09 | End: 2023-03-15 | Stop reason: HOSPADM

## 2023-03-09 RX ORDER — LABETALOL HYDROCHLORIDE 5 MG/ML
10 INJECTION, SOLUTION INTRAVENOUS
Status: DISCONTINUED | OUTPATIENT
Start: 2023-03-09 | End: 2023-03-09

## 2023-03-09 RX ORDER — ONDANSETRON 2 MG/ML
4 INJECTION INTRAMUSCULAR; INTRAVENOUS EVERY 30 MIN PRN
Status: DISCONTINUED | OUTPATIENT
Start: 2023-03-09 | End: 2023-03-09

## 2023-03-09 RX ORDER — METHADONE HYDROCHLORIDE 10 MG/ML
INJECTION, SOLUTION INTRAMUSCULAR; INTRAVENOUS; SUBCUTANEOUS PRN
Status: DISCONTINUED | OUTPATIENT
Start: 2023-03-09 | End: 2023-03-09

## 2023-03-09 RX ORDER — SODIUM CHLORIDE, SODIUM LACTATE, POTASSIUM CHLORIDE, CALCIUM CHLORIDE 600; 310; 30; 20 MG/100ML; MG/100ML; MG/100ML; MG/100ML
INJECTION, SOLUTION INTRAVENOUS CONTINUOUS
Status: DISCONTINUED | OUTPATIENT
Start: 2023-03-09 | End: 2023-03-09

## 2023-03-09 RX ORDER — METHOCARBAMOL 500 MG/1
500 TABLET, FILM COATED ORAL 3 TIMES DAILY
Qty: 45 TABLET | Refills: 1 | Status: SHIPPED | OUTPATIENT
Start: 2023-03-09 | End: 2023-03-24

## 2023-03-09 RX ORDER — MAGNESIUM SULFATE HEPTAHYDRATE 40 MG/ML
2 INJECTION, SOLUTION INTRAVENOUS
Status: COMPLETED | OUTPATIENT
Start: 2023-03-09 | End: 2023-03-09

## 2023-03-09 RX ORDER — CEFAZOLIN SODIUM/WATER 2 G/20 ML
2 SYRINGE (ML) INTRAVENOUS
Status: COMPLETED | OUTPATIENT
Start: 2023-03-09 | End: 2023-03-09

## 2023-03-09 RX ORDER — OXYCODONE HYDROCHLORIDE 10 MG/1
10 TABLET ORAL
Status: DISCONTINUED | OUTPATIENT
Start: 2023-03-09 | End: 2023-03-15 | Stop reason: HOSPADM

## 2023-03-09 RX ORDER — METHADONE HYDROCHLORIDE 10 MG/ML
2 INJECTION, SOLUTION INTRAMUSCULAR; INTRAVENOUS; SUBCUTANEOUS 3 TIMES DAILY PRN
Status: DISCONTINUED | OUTPATIENT
Start: 2023-03-09 | End: 2023-03-09

## 2023-03-09 RX ORDER — TRANEXAMIC ACID 10 MG/ML
10 INJECTION, SOLUTION INTRAVENOUS ONCE
Status: DISCONTINUED | OUTPATIENT
Start: 2023-03-09 | End: 2023-03-09 | Stop reason: HOSPADM

## 2023-03-09 RX ORDER — HYDROMORPHONE HCL IN WATER/PF 6 MG/30 ML
0.2 PATIENT CONTROLLED ANALGESIA SYRINGE INTRAVENOUS
Status: DISCONTINUED | OUTPATIENT
Start: 2023-03-09 | End: 2023-03-15 | Stop reason: HOSPADM

## 2023-03-09 RX ORDER — ACETAMINOPHEN 325 MG/1
650 TABLET ORAL EVERY 4 HOURS PRN
Status: DISCONTINUED | OUTPATIENT
Start: 2023-03-12 | End: 2023-03-15 | Stop reason: HOSPADM

## 2023-03-09 RX ORDER — ONDANSETRON 4 MG/1
4 TABLET, ORALLY DISINTEGRATING ORAL EVERY 6 HOURS PRN
Status: DISCONTINUED | OUTPATIENT
Start: 2023-03-09 | End: 2023-03-15 | Stop reason: HOSPADM

## 2023-03-09 RX ORDER — HYDROXYZINE HYDROCHLORIDE 10 MG/1
10 TABLET, FILM COATED ORAL EVERY 6 HOURS
Status: DISCONTINUED | OUTPATIENT
Start: 2023-03-09 | End: 2023-03-15 | Stop reason: HOSPADM

## 2023-03-09 RX ORDER — PROCHLORPERAZINE MALEATE 5 MG
5 TABLET ORAL EVERY 6 HOURS PRN
Status: DISCONTINUED | OUTPATIENT
Start: 2023-03-09 | End: 2023-03-14

## 2023-03-09 RX ORDER — OXYCODONE HYDROCHLORIDE 5 MG/1
10 TABLET ORAL EVERY 4 HOURS PRN
Status: DISCONTINUED | OUTPATIENT
Start: 2023-03-09 | End: 2023-03-09

## 2023-03-09 RX ORDER — CARBIDOPA AND LEVODOPA 25; 100 MG/1; MG/1
1 TABLET ORAL 3 TIMES DAILY
Status: DISCONTINUED | OUTPATIENT
Start: 2023-03-09 | End: 2023-03-15 | Stop reason: HOSPADM

## 2023-03-09 RX ORDER — LIDOCAINE 40 MG/G
CREAM TOPICAL
Status: DISCONTINUED | OUTPATIENT
Start: 2023-03-09 | End: 2023-03-15 | Stop reason: HOSPADM

## 2023-03-09 RX ORDER — CEFAZOLIN SODIUM/WATER 2 G/20 ML
2 SYRINGE (ML) INTRAVENOUS SEE ADMIN INSTRUCTIONS
Status: DISCONTINUED | OUTPATIENT
Start: 2023-03-09 | End: 2023-03-09 | Stop reason: HOSPADM

## 2023-03-09 RX ORDER — METHOCARBAMOL 500 MG/1
500 TABLET, FILM COATED ORAL EVERY 6 HOURS
Status: DISCONTINUED | OUTPATIENT
Start: 2023-03-09 | End: 2023-03-15 | Stop reason: HOSPADM

## 2023-03-09 RX ORDER — KETOROLAC TROMETHAMINE 30 MG/ML
INJECTION, SOLUTION INTRAMUSCULAR; INTRAVENOUS PRN
Status: DISCONTINUED | OUTPATIENT
Start: 2023-03-09 | End: 2023-03-09

## 2023-03-09 RX ORDER — DEXAMETHASONE SODIUM PHOSPHATE 4 MG/ML
INJECTION, SOLUTION INTRA-ARTICULAR; INTRALESIONAL; INTRAMUSCULAR; INTRAVENOUS; SOFT TISSUE PRN
Status: DISCONTINUED | OUTPATIENT
Start: 2023-03-09 | End: 2023-03-09

## 2023-03-09 RX ORDER — KETOROLAC TROMETHAMINE 15 MG/ML
15 INJECTION, SOLUTION INTRAMUSCULAR; INTRAVENOUS
Status: COMPLETED | OUTPATIENT
Start: 2023-03-09 | End: 2023-03-09

## 2023-03-09 RX ORDER — BISACODYL 10 MG
10 SUPPOSITORY, RECTAL RECTAL DAILY PRN
Status: DISCONTINUED | OUTPATIENT
Start: 2023-03-09 | End: 2023-03-15 | Stop reason: HOSPADM

## 2023-03-09 RX ORDER — LIDOCAINE HYDROCHLORIDE 20 MG/ML
INJECTION, SOLUTION INFILTRATION; PERINEURAL PRN
Status: DISCONTINUED | OUTPATIENT
Start: 2023-03-09 | End: 2023-03-09

## 2023-03-09 RX ORDER — ALBUTEROL SULFATE 0.83 MG/ML
2.5 SOLUTION RESPIRATORY (INHALATION) EVERY 4 HOURS PRN
Status: DISCONTINUED | OUTPATIENT
Start: 2023-03-09 | End: 2023-03-09

## 2023-03-09 RX ORDER — BUPROPION HYDROCHLORIDE 150 MG/1
150 TABLET ORAL DAILY
Status: DISCONTINUED | OUTPATIENT
Start: 2023-03-09 | End: 2023-03-15 | Stop reason: HOSPADM

## 2023-03-09 RX ORDER — HYDROMORPHONE HCL IN WATER/PF 6 MG/30 ML
0.4 PATIENT CONTROLLED ANALGESIA SYRINGE INTRAVENOUS
Status: DISCONTINUED | OUTPATIENT
Start: 2023-03-09 | End: 2023-03-15 | Stop reason: HOSPADM

## 2023-03-09 RX ADMIN — GABAPENTIN 100 MG: 100 CAPSULE ORAL at 06:40

## 2023-03-09 RX ADMIN — Medication 15 MG: at 07:59

## 2023-03-09 RX ADMIN — CEFAZOLIN SODIUM 2 G: 2 INJECTION, SOLUTION INTRAVENOUS at 16:17

## 2023-03-09 RX ADMIN — SENNOSIDES AND DOCUSATE SODIUM 2 TABLET: 50; 8.6 TABLET ORAL at 20:27

## 2023-03-09 RX ADMIN — SODIUM CHLORIDE, POTASSIUM CHLORIDE, SODIUM LACTATE AND CALCIUM CHLORIDE: 600; 310; 30; 20 INJECTION, SOLUTION INTRAVENOUS at 07:20

## 2023-03-09 RX ADMIN — DEXAMETHASONE SODIUM PHOSPHATE 8 MG: 4 INJECTION, SOLUTION INTRA-ARTICULAR; INTRALESIONAL; INTRAMUSCULAR; INTRAVENOUS; SOFT TISSUE at 07:59

## 2023-03-09 RX ADMIN — PHENYLEPHRINE HYDROCHLORIDE 150 MCG: 10 INJECTION INTRAVENOUS at 07:59

## 2023-03-09 RX ADMIN — BUPROPION HYDROCHLORIDE 150 MG: 150 TABLET, EXTENDED RELEASE ORAL at 14:37

## 2023-03-09 RX ADMIN — PROPOFOL 100 MG: 10 INJECTION, EMULSION INTRAVENOUS at 07:59

## 2023-03-09 RX ADMIN — METHOCARBAMOL 500 MG: 500 TABLET ORAL at 18:23

## 2023-03-09 RX ADMIN — OXYCODONE HYDROCHLORIDE 10 MG: 5 TABLET ORAL at 12:19

## 2023-03-09 RX ADMIN — ACETAMINOPHEN 975 MG: 325 TABLET ORAL at 07:00

## 2023-03-09 RX ADMIN — KETOROLAC TROMETHAMINE 15 MG: 15 INJECTION, SOLUTION INTRAMUSCULAR; INTRAVENOUS at 18:22

## 2023-03-09 RX ADMIN — Medication 80 MG: at 07:59

## 2023-03-09 RX ADMIN — ACETAMINOPHEN 975 MG: 325 TABLET ORAL at 14:37

## 2023-03-09 RX ADMIN — SENNOSIDES AND DOCUSATE SODIUM 2 TABLET: 50; 8.6 TABLET ORAL at 17:02

## 2023-03-09 RX ADMIN — SODIUM CHLORIDE: 9 INJECTION, SOLUTION INTRAVENOUS at 14:40

## 2023-03-09 RX ADMIN — KETOROLAC TROMETHAMINE 15 MG: 15 INJECTION, SOLUTION INTRAMUSCULAR; INTRAVENOUS at 12:02

## 2023-03-09 RX ADMIN — PROPOFOL 50 MCG/KG/MIN: 10 INJECTION, EMULSION INTRAVENOUS at 08:24

## 2023-03-09 RX ADMIN — DULOXETINE HYDROCHLORIDE 90 MG: 60 CAPSULE, DELAYED RELEASE ORAL at 14:37

## 2023-03-09 RX ADMIN — MIDAZOLAM 1 MG: 1 INJECTION INTRAMUSCULAR; INTRAVENOUS at 07:53

## 2023-03-09 RX ADMIN — HYDROXYZINE HYDROCHLORIDE 10 MG: 10 TABLET ORAL at 12:19

## 2023-03-09 RX ADMIN — CARBIDOPA AND LEVODOPA 1 TABLET: 25; 100 TABLET ORAL at 20:26

## 2023-03-09 RX ADMIN — METHOCARBAMOL 500 MG: 500 TABLET ORAL at 12:19

## 2023-03-09 RX ADMIN — PANTOPRAZOLE SODIUM 40 MG: 40 TABLET, DELAYED RELEASE ORAL at 14:37

## 2023-03-09 RX ADMIN — Medication 2 G: at 07:52

## 2023-03-09 RX ADMIN — ONDANSETRON 4 MG: 2 INJECTION INTRAMUSCULAR; INTRAVENOUS at 10:07

## 2023-03-09 RX ADMIN — LATANOPROST 1 DROP: 50 SOLUTION/ DROPS OPHTHALMIC at 23:15

## 2023-03-09 RX ADMIN — ACETAMINOPHEN 975 MG: 325 TABLET ORAL at 23:15

## 2023-03-09 RX ADMIN — METHADONE HYDROCHLORIDE 2 MG: 10 INJECTION, SOLUTION INTRAMUSCULAR; INTRAVENOUS; SUBCUTANEOUS at 11:59

## 2023-03-09 RX ADMIN — LOSARTAN POTASSIUM 12.5 MG: 25 TABLET, FILM COATED ORAL at 14:37

## 2023-03-09 RX ADMIN — PHENYLEPHRINE HYDROCHLORIDE 0.5 MCG/KG/MIN: 10 INJECTION INTRAVENOUS at 08:23

## 2023-03-09 RX ADMIN — SODIUM CHLORIDE, POTASSIUM CHLORIDE, SODIUM LACTATE AND CALCIUM CHLORIDE: 600; 310; 30; 20 INJECTION, SOLUTION INTRAVENOUS at 08:25

## 2023-03-09 RX ADMIN — PHENYLEPHRINE HYDROCHLORIDE 200 MCG: 10 INJECTION INTRAVENOUS at 07:59

## 2023-03-09 RX ADMIN — HYDROXYZINE HYDROCHLORIDE 10 MG: 10 TABLET ORAL at 18:23

## 2023-03-09 RX ADMIN — PHENYLEPHRINE HYDROCHLORIDE 50 MCG: 10 INJECTION INTRAVENOUS at 08:09

## 2023-03-09 RX ADMIN — METHADONE HYDROCHLORIDE 2 MG: 10 INJECTION, SOLUTION INTRAMUSCULAR; INTRAVENOUS; SUBCUTANEOUS at 11:47

## 2023-03-09 RX ADMIN — LIDOCAINE HYDROCHLORIDE 50 MG: 20 INJECTION, SOLUTION INFILTRATION; PERINEURAL at 07:59

## 2023-03-09 RX ADMIN — KETOROLAC TROMETHAMINE 15 MG: 30 INJECTION, SOLUTION INTRAMUSCULAR at 07:59

## 2023-03-09 RX ADMIN — OXYCODONE HYDROCHLORIDE 10 MG: 10 TABLET ORAL at 20:27

## 2023-03-09 RX ADMIN — SODIUM CHLORIDE, POTASSIUM CHLORIDE, SODIUM LACTATE AND CALCIUM CHLORIDE: 600; 310; 30; 20 INJECTION, SOLUTION INTRAVENOUS at 10:58

## 2023-03-09 RX ADMIN — CARBIDOPA AND LEVODOPA 1 TABLET: 25; 100 TABLET ORAL at 14:37

## 2023-03-09 RX ADMIN — MAGNESIUM SULFATE HEPTAHYDRATE 2 G: 2 INJECTION, SOLUTION INTRAVENOUS at 12:10

## 2023-03-09 ASSESSMENT — ACTIVITIES OF DAILY LIVING (ADL)
ADLS_ACUITY_SCORE: 20
ADLS_ACUITY_SCORE: 21
ADLS_ACUITY_SCORE: 20
ADLS_ACUITY_SCORE: 21
ADLS_ACUITY_SCORE: 20
ADLS_ACUITY_SCORE: 21

## 2023-03-09 NOTE — ANESTHESIA CARE TRANSFER NOTE
Patient: Angela Martinez    Procedure: Procedure(s):  LUMBAR TWO TO LUMBAR FIVE OBLIQUE LATERAL LUMBAR INTERBODY FUSION WITH BILATERAL IMPLANTS       Diagnosis: Spondylolisthesis of lumbar region [M43.16]  Spinal stenosis of lumbar region with neurogenic claudication [M48.062]  Other idiopathic scoliosis, lumbar region [M41.26]  Diagnosis Additional Information: No value filed.    Anesthesia Type:   General     Note:    Oropharynx: spontaneously breathing  Level of Consciousness: drowsy  Oxygen Supplementation: face mask  Level of Supplemental Oxygen (L/min / FiO2): 6l  Independent Airway: airway patency satisfactory and stable  Dentition: dentition unchanged  Vital Signs Stable: post-procedure vital signs reviewed and stable  Report to RN Given: handoff report given  Patient transferred to: PACU  Comments: Pt to PACU, VSS, report to RN  Handoff Report: Identifed the Patient, Identified the Reponsible Provider, Reviewed the pertinent medical history, Discussed the surgical course, Reviewed Intra-OP anesthesia mangement and issues during anesthesia, Set expectations for post-procedure period and Allowed opportunity for questions and acknowledgement of understanding      Vitals:  Vitals Value Taken Time   /62 03/09/23 1137   Temp 97.7  F (36.5  C) 03/09/23 1137   Pulse 75 03/09/23 1137   Resp 9 03/09/23 1137   SpO2 99 % 03/09/23 1136   Vitals shown include unvalidated device data.    Electronically Signed By: SIDNEY Callahan CRNA  March 9, 2023  11:37 AM

## 2023-03-09 NOTE — OP NOTE
PRE-PROCEDURE DIAGNOSIS:  1) L2 L5 recurrent  Stenosis prior laminectomy listhesis and scoliosis with neurogenic claudication     POST-PROCEDURE DIAGNOSIS:  1) Same as above  PROCEDURE PERFORMED:  1) L2 L5  oblique lateral lumbar interbody fusion with discectomy, preparation of the endplate and placement of a bullet cage packed with calcium triphosphate soaked in bone marrow anterior to the transverse process in modified prone position, with intraoperative biplanar fluoroscopic imaging and electrophysiological monitoring.  space and inside the cage  2) L2 L5 Posterior minimally invasive pedicle screw placement and posterolateral instrumentation and fusion with lnk system with intraoperative biplanar fluoroscopic imaging and electrophysiological monitoring.  3) Transpedicular Bone marrow aspiration  4) BMI 41.5 requiring at least 30% procedure time due to difficulties with positioning and poor x ray visualization     EBL: 100cc        Surgeon: Evin Villarreal PA-C     1st assistant needed for patient positioning wound closure and assistance with instrumentation for this complex spine surgery.            HISTORY: Please refer to my clinic note for full details, but in short, patient is a 78-year-old  female  with above diagnosis not responding to usual conservative therapy. Patient was set up for the surgery as mentioned above and was taken to surgery as mentioned above after all risks and benefits were explained.     PROCEDURE:  The patient was taken to surgery. After general anesthesia was applied, SCDs and Benedict placed and preoperative antibiotic given, then patient has been positioned on the Roscoe table and Jarad frame in a modified prone position for ease of access from the left side.  Benedict catheter was placed by a urologist due to his urethral stricture requiring dilatation via cystoscope.      AP and lateral fluoroscopic images are positioned. Patient has been  prepped and draped in sterile fashion. The landmarks, including Spinal process, transverse process, disk space, endplates and pedicels are identified and marked.  A Jamshidi needle is placed in upper right pedicle inside of the vertebral body and bone marrow has been aspirated to be mixed with biologics to introduce Stem cells to the biologics.  Following steps are then taken for levels:        L3 L4    Cage size 12mm high and 33 mm long Titaium packed with bone marrow soaded calcium TPP   L2 L3 Cage size 11mm high and 30mm long Titanium  L4 L5 Cage size 12mm high and 30mmg long Titanium     Cages went in via the Kambin's triangle at EMG silent window greater than 3 Ma.      The patient was turned using the rotation of the surgical table so that a near direct anterior-lateral approach to the lumbar spine could be achieved. A small incision was then made superior to the mid iliac crest and then using biplanar fluoroscopic visualization, under electrophysiological monitoring and stimulation, we introduced an electrophysiological probe through the retro peritoneal space into the desired discs anterior to the transverse processes and then passed it into the disc space after finding a silent window. The sleeve is retained and the probe is removed, then a K wire was passed sequentially into the disk space. A dilating tube was then passed along this same route. Following this, a working channel was then passed sequentially into the disc spaces. The working channel was manually held in position while a series of disc cleaning tools was passed through the channel to remove the affected discs under clear and direct biplanar fluoroscopic visualization, decompress the nerve roots, and decorticate the vertebral endplates at those segments.  Arthrodesis of the intervertebral spaces via an anterior retroperitoneal exposure and application of an intervertebral biomechanical device was then accomplished by using the working channel  that had been placed in the retroperitoneal space anterior to the transverse processes. After adequate decompression and preparation of the endplates, we then put calcium triphosphate soaked in bone marrow anterior into disc space and the working channel was then removed after a K-Wire was reinserted. Then a interbody was packed tightly with allograft bone for stabilization and arthrodesis of the intervertebral spaces and inserted into the mid portion of the intervertebral discs. This was done under biplanar fluoroscopic visualization. All bone was confined to the borders of the disc space.   The disc space and cage was packed with both small dose INFUSE and bone marrow soaked tricalcium phosphate.   Posterior facets and pars area were also packed with the same after decortication with minimally invasive device.      Physiological Decompression of foramen, lateral recess and spinal canal is achieved by restoring the anatomical distance of inter discal space and increasing inter pedicular distance with interbody graft.     Following steps are then taken for levels:      L3  7.5mm Screw size Right 55 Left 55  L4  7.5mm Screw size Right 55 and Left 55  L2  7.5mm Screw size Right 55 Left 55  L5  7.5mm Screw size Right 45 Left 45        Then patient is rotated for a true prone position. Then entry point for the pedicles is identified in the AP and lateral view, and then skin incision has been injected with local anesthetic. Then we entered the pedicle with a Jamshidi needle. Over the Jamshidi needle, we introduced the K wire in Vertebral body.  Additionally we use a small periosteal elevator along the screws to refresh the surface of the bone and facet and put minimal amount of Calcium-triphosphate soaked in bone marrow for additional posterolateral fusion. Over the K wire then , we dilate the muscle with the dilator and then put a pedicle screws bilaterally. Screws are all silent up to   25 MA of stimulation. After  screws are all placed, we put gustavo in place and under fluoroscopic imaging, we locked the gustavo in place and removed the screw tops and then each incision has been closed with 2-0 Vicryl sutures.     Final ap and lateral  C arm images showed good positioning of all the hardware     7 incisions were made for this case.  The cage went in about 2 cm incision.  Others were 1 cm to 2Cm lengths.               DISPOSITION: To PACU with postoperative antibiotic. All counts are correct at the end of the surgery.     Evin Thompson MD

## 2023-03-09 NOTE — ANESTHESIA PREPROCEDURE EVALUATION
Anesthesia Pre-Procedure Evaluation    Patient: Angela Martinez   MRN: [id number] : 1945        Procedure : Procedure(s):  LUMBAR TWO TO LUMBAR FIVE OBLIQUE LATERAL LUMBAR INTERBODY FUSION WITH BILATERAL IMPLANTS          Past Medical History:   Diagnosis Date     Arthritis      Gastroesophageal reflux disease      Hypertension      Other chronic pain      Parkinson disease (H)     pt is being ruled out for parkinson's     Parkinsons disease (H)       Past Surgical History:   Procedure Laterality Date     BIOPSY       CHOLECYSTECTOMY       EYE SURGERY       LAMINECTOMY LUMBAR TWO LEVELS Bilateral 2021    Procedure: LUMBAR 3-5 BILATERAL MEDIAL FACETECTOMY AND DECOMPRESSION;  Surgeon: Jef Gan MD;  Location: [facility] Main OR     ORTHOPEDIC SURGERY       TONSILLECTOMY        Allergies   Allergen Reactions     Lisinopril Cough     Oxycodone GI Disturbance     Penicillins Hives and Swelling     Pneumovax [Pneumococcal Polysaccharides] Swelling     Tetanus Toxoid Swelling      Social History     Tobacco Use     Smoking status: Never     Smokeless tobacco: Never   Substance Use Topics     Alcohol use: Not Currently      Wt Readings from Last 1 Encounters:   23 93.3 kg (205 lb 11.2 oz)        Anesthesia Evaluation   Pt has had prior anesthetic.     No history of anesthetic complications       ROS/MED HX  ENT/Pulmonary:     (+) AZAR risk factors, hypertension, obese,     Neurologic:     (+) peripheral neuropathy, Parkinson's disease, features: possible,     Cardiovascular:     (+) hypertension-----    METS/Exercise Tolerance:     Hematologic:  - neg hematologic  ROS     Musculoskeletal:   (+) arthritis,     GI/Hepatic:     (+) GERD,     Renal/Genitourinary:  - neg Renal ROS     Endo:     (+) Obesity,     Psychiatric/Substance Use:       Infectious Disease:  - neg infectious disease ROS     Malignancy:       Other:      (+) , H/O Chronic Pain,        Physical Exam    Airway         Mallampati: II   TM distance: > 3 FB   Neck ROM: full   Mouth opening: > 3 cm    Respiratory Devices and Support         Dental       (+) Modest Abnormalities - crowns, retainers, 1 or 2 missing teeth      Cardiovascular          Rhythm and rate: regular and normal     Pulmonary           breath sounds clear to auscultation           OUTSIDE LABS:  CBC:   Lab Results   Component Value Date    HGB 11.8 03/09/2023     BMP:   Lab Results   Component Value Date    GLC 93 12/09/2021     COAGS: No results found for: PTT, INR, FIBR  POC: No results found for: BGM, HCG, HCGS  HEPATIC: No results found for: ALBUMIN, PROTTOTAL, ALT, AST, GGT, ALKPHOS, BILITOTAL, BILIDIRECT, FREYA  OTHER: No results found for: PH, LACT, A1C, RAUL, PHOS, MAG, LIPASE, AMYLASE, TSH, T4, T3, CRP, SED    Anesthesia Plan    ASA Status:  3   NPO Status:  NPO Appropriate    Anesthesia Type: General.     - Airway: ETT   Induction: Intravenous.   Maintenance: Balanced.        Consents    Anesthesia Plan(s) and associated risks, benefits, and realistic alternatives discussed. Questions answered and patient/representative(s) expressed understanding.    - Discussed:     - Discussed with:  Patient      - Extended Intubation/Ventilatory Support Discussed: No.      - Patient is DNR/DNI Status: No    Use of blood products discussed: No .     Postoperative Care    Pain management: IV analgesics, Multi-modal analgesia, Oral pain medications.     - Plan for long acting post-op opioid use   PONV prophylaxis: Ondansetron (or other 5HT-3), Dexamethasone or Solumedrol     Comments:                Nicolas Nguyen MD

## 2023-03-09 NOTE — CONSULTS
[facility]  Pain Service Consultation   Text Page    Date of Admission:  3/9/2023    Assessment & Plan   Angela Martinez is a 78 year old female who was admitted on 3/9/2023. I was asked by Linda Martínez PA-C to see the patient for  Acute postoperative pain management, status post L2-L5 orOLLIF with bilateral implants with surgeon on 3/9/23 Evin Thompson MD    Primary Care Physician   Primary Care Physician:Carolina Westbrook  Pain Specialist: None  Chief Complaint   Acute postoperative pain focal  Pain type musculoskeletal    PLAN:   1)  Opioids:  Change oxycodone 5 to 10 mg to every 3 hours as needed pain  Continue Dilaudid IV 0.2 to 0.4 mg every 2 hours as needed pain  Opioids Treatment Goal:   -Improvement in function  -Participate in PT  -Post-operative management of pain, expected 3-7 days needed    2)Non-opioid multimodal medication therapy  -Topical: Deferred until dressing is removed  -N-SAIDS:Ketorolac 15 mg every 6 hours as ordered  -Muscle Relaxants:Methocarbamol 500 mg every 6 hours, hold for sedation  -Adjuvants:Acetaminophen 975 mg every 8 hours, Hydroxyzine 10 mg every 6 hours, hold for sedation  -Antidepresants/anxiolytics:Duloxetine, Bupropion both at home PTA doses    3)  Non-medication interventions  Positioning, ICE, Relaxation, Distraction, Essential oils per nursing, Physical therapy, Brace     4)  Constipation Prophylaxis    Continue to Monitor, Bowel Meds PRN per Constipation Order Set, Senna-S 2 to 3 tablets twice daily, Polyethylene Glycol daily, Bisacodyl daily as needed, milk of magnesia daily as needed    5) Medication Risk reduction strategies   -monitor for sedation   -Capnography per protocol   -narcan for opioid reversal  -Large habitus will likely need supplemental oxygen to maintain oxygen saturations during night.  6)  Pain Education  -Opioid safe use, storage and disposal information included in DC AVS    7)  DC Planning   Discussed goal of Opioid  therapy as above with patient and spouse, reviewed that pain will improve as this is acute due to surgery and need for opioid management is anticipated to reduce.  Reviewed risk of opioid therapy, risk of respiratory suppression and to safeguard medications while under their care.  Recommend short supply of opioids only (3 to 5 days) per CDC guidelines for acute pain management.  Continued outpatient management of pain per surgeon.  Disposition: Likely home.  Support systems: Spouse  Outpatient Referrals: None at this time  The following risk factors have been identified for unintentional overdose: patient is on multiple sedating medications , patient is > 65 years old, patient has anxiety, depression or PTSD and patient has likley undiagnosed sleep disordered breathing. Discharge with intra-nasal naloxone if discharged to home with opioids  >40 mg MME/day.  Plan for education prior to discharge.  Recommendations for discharge  -continue Oxycodone 5-10 mg every 3 hrs prn   -continue home Bupropion and Cymbalta   -continue methocarbamol ( Robaxin) may reduce to prn     ASSESSMENT  1)  Acute on chronic pain low back pain.  Status post 0 LLIF L2-5, P0D 0 with  mL    2)  Patient with chronic pain,not on chronic opioid therapy managed  Baseline 0mg Daily Morphine Equivalent as dispensed and as reported daily use.  Patient has no expected opioid tolerance.     Patient's opioid use in past 24 hours: Methadone 4 mg IV, oxycodone 10 mg = 39 mg Daily Morphine Equivalent    3)  Risk factors for opioid related harms  -Sleep disordered breathing likely STOP_BANG  greater than 3 indicating high risk of obstructive sleep apnea  - Age > 65 years old  -Anxiety/depression  -Opioid has recently been changed    4)  Opioid induced side-effects:  -Constipation yes by history.  May have stools only weekly.  -Nausea/Vomit none reported  -Sedation mildly  -Urinary Retention Benedict in place  -Respiratory Depression none noted.    5)   Other/Related:    -Depression/anxiety  -Deconditioning   -Sleep Apnea probable    Time Spent on this Encounter   Medical Decision Making:     Please see A&P for additional details of medical decision making.  NOTE(S)/MEDICAL RECORDS REVIEWED over the past 24 hours: Outside records, H&P, labs, EKG,  Tests REVIEWED in the past 24 hours:  - BMP  - CBC  Medical complexity over the past 24 hours:  - Decision regarding ESCALATION OF LEVEL OF CARE  - Prescription DRUG MANAGEMENT performed   Time spend counseling with patient and family consisted of the following topics, goals of care and symptom management, education on pain plan, medication side effects and duration of plan.  Time spent in coordination of care with Bedside Nurse Yolanda Dexter RN . ANA Irwin, SIDNEY CNP, PGMT-BC, [facility]  Acute Pain Team [facility]   Office : [phone]  Pager [phone]   [facility] Paging/Directory Pain  Securely message with the Vocera Web Console (learn more here)      History is obtained from the patient and electronic health record    History of Present Illness   Angela Martinez is a 78 year old female who presents with past medical history of chronic low back pain, arthritis, reflux, hypertension, Parkinson-like symptoms, morbid obesity snoring.  Her pain location is focal and low back right more than left and described sharp, focal, feeling like surgical pain. She rates it as ranging between 7/10 and 9/10, averaging 8/10 on a numeric pain scale. Currently rates pain at 8/10.  Pain is improved by ice, medications, rest and aggravated by movement and medications wearing off.  The patient has greater functional impairment yes.  Past pain treatments include medications medications, non pharmacologic physical therapy and previous surgeries of lumbar laminectomy 3 level.     [place] [employer] [facility] Data Base Reviewed:    YES;  As expected, no concern for misuse/abuse of controlled medications based on this report.  OPIOID RISK [id number]    Past Medical History   I have reviewed this patient's medical history and updated it with pertinent information if needed.   Past Medical History:   Diagnosis Date     Arthritis      Gastroesophageal reflux disease      Hypertension      Other chronic pain      Parkinson disease (H)     pt is being ruled out for parkinson's     Parkinsons disease (H)        Past Surgical History   I have reviewed this patient's surgical history and updated it with pertinent information if needed.  Past Surgical History:   Procedure Laterality Date     BIOPSY       CHOLECYSTECTOMY       EYE SURGERY       LAMINECTOMY LUMBAR TWO LEVELS Bilateral 12/9/2021    Procedure: LUMBAR 3-5 BILATERAL MEDIAL FACETECTOMY AND DECOMPRESSION;  Surgeon: Jef Gan MD;  Location: [facility] OR     ORTHOPEDIC SURGERY       TONSILLECTOMY           Prior to Admission Medications   Prior to Admission Medications   Prescriptions Last Dose Informant Patient Reported? Taking?   DULoxetine (CYMBALTA) 30 MG capsule 3/8/2023 at 0900  Yes Yes   Sig: Take 90 mg by mouth daily    acetaminophen (TYLENOL) 325 MG tablet 3/7/2023  No Yes   Sig: Take 2 tablets (650 mg) by mouth every 4 hours as needed for mild pain   buPROPion (WELLBUTRIN XL) 150 MG 24 hr tablet 3/8/2023 at 0900  Yes Yes   Sig: Take 1 tablet by mouth daily   carbidopa-levodopa (SINEMET)  MG tablet 3/9/2023 at 0630  Yes Yes   Sig: Take 1 tablet by mouth 3 times daily   ferrous sulfate (FE TABS) 325 (65 Fe) MG EC tablet 3/1/2023  Yes Yes   Sig: Take 1 tablet by mouth daily   latanoprost (XALATAN) 0.005 % ophthalmic solution 3/8/2023 at 2100  Yes Yes   Sig: Place 1 drop into both eyes At Bedtime   liraglutide - Weight Management (SAXENDA) 18 MG/3ML pen 3/8/2023 at 0900  Yes Yes   Sig: Inject 0.6 mg Subcutaneous daily   losartan (COZAAR) 25 MG tablet 3/8/2023 at  0900  Yes Yes   Sig: Take 12.5 mg by mouth daily   metroNIDAZOLE (METROCREAM) 0.75 % external cream More than a month  Yes Yes   Sig: Apply topically 2 times daily as needed Apply to face.   omeprazole (PRILOSEC) 20 MG DR capsule 3/8/2023 at 0900  Yes Yes   Sig: Take 20 mg by mouth daily   ondansetron (ZOFRAN-ODT) 4 MG ODT tab 3/6/2023  No Yes   Sig: Take 1-2 tablets (4-8 mg) by mouth every 8 hours as needed for nausea      Facility-Administered Medications: None     Allergies   Allergies   Allergen Reactions     Lisinopril Cough     Oxycodone GI Disturbance     Penicillins Hives and Swelling     Pneumovax [Pneumococcal Polysaccharides] Swelling     Tetanus Toxoid Swelling       Social History   I have reviewed this patient's social history and updated it with pertinent information if needed. Angela Martinez  reports that she has never smoked. She has never used smokeless tobacco. She reports that she does not currently use alcohol. She reports that she does not use drugs.    Family History   I have reviewed this patient's family history and updated it with pertinent information if needed.   History reviewed. No pertinent family history.  Family history of addiction  none    Review of Systems   The 10 point Review of Systems is negative other than noted in the HPI or here.    Denies Bowel or bladder dysfunction    Physical Exam   Temp:  [96.9  F (36.1  C)-97.9  F (36.6  C)] 96.9  F (36.1  C)  Pulse:  [75-90] 83  Resp:  [5-16] 11  BP: (126-154)/(61-87) 134/61  SpO2:  [95 %-98 %] 96 %  205 lbs 11.2 oz  GEN:  Alert, oriented x 3, appears comfortable, No apparent distress.  HEENT:  Normocephalic/atraumatic, no scleral icterus, no nasal discharge, mouth moist.  CV:  RRR, S1, S2; no murmurs or other irregularities noted.  +3 DP/PT pulses bilaterally; no edema bilateral lower extremeties.  RESP:  Clear to auscultation bilaterally without rales/rhonchi/wheezing/retractions.  Symmetric chest rise on inhalation noted.   Normal respiratory effort.  ABD:  Rounded, soft, non-tender/non-distended.  +BS  EXT:  Edema & pulses as noted above.  Color, moisture and sensation intact x 4.     M/S:   nontender to palpation throughout.  MILLER x 4  SKIN:  Dry to touch, no exanthems noted in the visualized areas.    NEURO: Symmetric strength and movement  +5/5. Good dorsi flexion plantarflexion, bilateral.  Sensation to touch intact all extremities.   PAIN BEHAVIOR: Cooperative  Psych:  Normal affect.  Calm, cooperative, conversant appropriately.       Data   Results for orders placed or performed during the hospital encounter of 03/09/23 (from the past 24 hour(s))   Hemoglobin   Result Value Ref Range    Hemoglobin 11.8 11.7 - 15.7 g/dL   ABO/Rh type and screen    Narrative    The following orders were created for panel order ABO/Rh type and screen.  Procedure                               Abnormality         Status                     ---------                               -----------         ------                     Adult Type and Screen[[id number]]                            Final result                 Please view results for these tests on the individual orders.   Adult Type and Screen   Result Value Ref Range    ABO/RH(D) A NEG     Antibody Screen Negative Negative    SPECIMEN EXPIRATION DATE [id number]    XR Surgery KYA L/T 5 Min Fluoro w Stills    Narrative    This exam was marked as non-reportable because it will not be read by a   radiologist or a [place] non-radiologist provider.

## 2023-03-09 NOTE — CONSULTS
Hospitalist Consultation      Angela Martinez MRN# [id number]   YOB: 1945 Age: 78 year old   Date of Admission: 3/9/2023     Requesting Physician:  Dr. Thompson  Reason for consult: Post-op medical management            Assessment and Plan:   This patient is a 78 year old female who is POD #0 s/p L2-5 fusion.  Overall doing well, no complaints. Pain controlled.    1. L2-5 fusion - pain, prophylaxis, diet and PT/OT per ortho. Doing well, notes pain but tolerable.   2. Parkinson's disease  Resume home Sinemet, Wellbutrin and Cymbalta  3. HTN  Resume home Losartan  4. GERD  Resume home PPI             History of Present Illness:   This patient is a 78 year old female who is POD #0 s/p L2-5 fusion. She has severe spondylolisthesis at L2-5 with neurogenic claudication and has failed conservative outpatient management so presents here for elective L2-5 fusion. She tolerated the surgery well, pain is well controlled, has had adequate I&Os, and she is tolerating PO intake. Benedict remains in place, she is not passing gas or had a BM yet. She denies fever, chills, chest pain, SOB, cough, abdominal pain, nausea, vomiting, or diarrhea. She also has a PMHx of Parkinson's disease, HTN and GERD.              Past Medical History:     Past Medical History:   Diagnosis Date     Arthritis      Gastroesophageal reflux disease      Hypertension      Other chronic pain      Parkinson disease (H)     pt is being ruled out for parkinson's     Parkinsons disease (H)                Past Surgical History:     Past Surgical History:   Procedure Laterality Date     BIOPSY       CHOLECYSTECTOMY       EYE SURGERY       LAMINECTOMY LUMBAR TWO LEVELS Bilateral 12/9/2021    Procedure: LUMBAR 3-5 BILATERAL MEDIAL FACETECTOMY AND DECOMPRESSION;  Surgeon: Jef Gan MD;  Location: [facility] OR     ORTHOPEDIC SURGERY       TONSILLECTOMY                   Social History:     Social History     Tobacco Use      Smoking status: Never     Smokeless tobacco: Never   Substance Use Topics     Alcohol use: Not Currently     Drug use: Never                 Family History:   I have reviewed this patient's family history          Allergies:     Allergies   Allergen Reactions     Lisinopril Cough     Oxycodone GI Disturbance     Penicillins Hives and Swelling     Pneumovax [Pneumococcal Polysaccharides] Swelling     Tetanus Toxoid Swelling             Medications:     Prior to Admission medications    Medication Sig Last Dose Taking? Auth Provider Long Term End Date   acetaminophen (TYLENOL) 325 MG tablet Take 2 tablets (650 mg) by mouth every 4 hours as needed for mild pain 3/7/2023 Yes Sophia Julio PA-C     buPROPion (WELLBUTRIN XL) 150 MG 24 hr tablet Take 1 tablet by mouth daily 3/8/2023 at 0900 Yes Reported, Patient Yes    carbidopa-levodopa (SINEMET)  MG tablet Take 1 tablet by mouth 3 times daily 3/9/2023 at 0630 Yes Reported, Patient Yes    DULoxetine (CYMBALTA) 30 MG capsule Take 90 mg by mouth daily  3/8/2023 at 0900 Yes Reported, Patient Yes    ferrous sulfate (FE TABS) 325 (65 Fe) MG EC tablet Take 1 tablet by mouth daily 3/1/2023 Yes Reported, Patient     latanoprost (XALATAN) 0.005 % ophthalmic solution Place 1 drop into both eyes At Bedtime 3/8/2023 at 2100 Yes Reported, Patient Yes    liraglutide - Weight Management (SAXENDA) 18 MG/3ML pen Inject 0.6 mg Subcutaneous daily 3/8/2023 at 0900 Yes Reported, Patient     losartan (COZAAR) 25 MG tablet Take 12.5 mg by mouth daily 3/8/2023 at 0900 Yes Reported, Patient Yes    metroNIDAZOLE (METROCREAM) 0.75 % external cream Apply topically 2 times daily as needed Apply to face. More than a month Yes Unknown, Entered By History     omeprazole (PRILOSEC) 20 MG DR capsule Take 20 mg by mouth daily 3/8/2023 at 0900 Yes Reported, Patient     ondansetron (ZOFRAN-ODT) 4 MG ODT tab Take 1-2 tablets (4-8 mg) by mouth every 8 hours as needed for nausea 3/6/2023 Yes  "Sophia Julio PA-C                 Review of Systems:   A comprehensive greater than 10 system review of systems was carried out.  Pertinent positives and negatives are noted above.  Otherwise negative for contributory info.            Physical Exam:   Vitals were reviewed  Blood pressure 119/59, pulse 81, temperature 96.9  F (36.1  C), temperature source Temporal, resp. rate 10, height 1.499 m (4' 11\"), weight 93.3 kg (205 lb 11.2 oz), SpO2 95 %.  Exam:    GENERAL:  Comfortable.  PSYCH: pleasant, oriented, No acute distress.  HEENT:  Normal conjunctiva, normal hearing, nasal mucosa and Oropharynx are normal.  NECK:  Supple, no neck vein distention  HEART:  Normal S1, S2 with no murmur, no pericardial rub, S3 or S4.  LUNGS:  Clear to auscultation, normal Respiratory effort.  GI:  Soft, normal bowel sounds.  EXTREMITIES:  No pedal edema, +2 pulses bilateral and equal.  SKIN:  Dry to touch, No rash, wound or ulcerations.  NEUROLOGIC:  Nonfocal with normal cranial nerve and motor power and sensation.            Data:   Past 24 hours labs, studies, and imaging were reviewed.  Recent Labs   Lab 03/09/23  0629   HGB 11.8     No results for input(s): NA, POTASSIUM, CHLORIDE, CO2, ANIONGAP, GLC, BUN, CR, GFRESTIMATED, GFRESTBLACK, RAUL, MAG, PHOS, PROTTOTAL, ALBUMIN, BILITOTAL, ALKPHOS, AST, ALT in the last 168 hours.    Kirti Heredia PA-C    "

## 2023-03-09 NOTE — ANESTHESIA PROCEDURE NOTES
Airway       Patient location during procedure: OR       Procedure Start/Stop Times: 3/9/2023 8:01 AM  Staff -        Anesthesiologist:  Nicolas Nguyen MD       CRNA: Will Chávez APRN CRNA       Performed By: CRNA  Consent for Airway        Urgency: elective  Indications and Patient Condition       Indications for airway management: hebert-procedural and airway protection       Induction type:intravenous       Mask difficulty assessment: 1 - vent by mask    Final Airway Details       Final airway type: endotracheal airway       Successful airway: ETT - single  Endotracheal Airway Details        ETT size (mm): 7.0       Cuffed: yes       Successful intubation technique: direct laryngoscopy       DL Blade Type: MAC 3       Grade View of Cords: 1       Adjucts: stylet       Position: Center       Measured from: lips       Secured at (cm): 22       Bite block used: None    Post intubation assessment        Placement verified by: capnometry, equal breath sounds and chest rise        Number of attempts at approach: 1       Number of other approaches attempted: 0       Secured with: plastic tape       Ease of procedure: easy       Dentition: Intact and Unchanged    Medication(s) Administered   Medication Administration Time: 3/9/2023 8:01 AM

## 2023-03-09 NOTE — PROGRESS NOTES
SPIRITUAL HEALTH SERVICES Progress Note  RH Pre-Op    Saw pt Angela SONI Michelle per her request for  support before her procedure.  Her spouse Curtis was present.  Pat reported that she is having back surgery and acknowledged feeling anxious about it.  She shared that she recited the Lord's Prayer last night unit she fell asleep.  Kasia is Jainism and affiliated with [facility] in [place], [place].  She named Curtis and their two sons as being core to her support network.  Pat welcomed prayer.    Plan: Informed pt and her spouse how they can request further  support once pt has been admitted to the floor.  This author and other chaplains remain available per pt/family request.    Armando Solano M.Div., [facility]  Staff     [facility] routine referrals *[phone]  [facility] available 24/7 for emergent requests/referrals, either by paging the on-call  or by entering an ASAP/STAT consult in Epic (this will also page the on-call ).

## 2023-03-09 NOTE — PROGRESS NOTES
Arrived to room (607 ) from PACU at (1345 ) via cart, transferred to bed via hover mat without difficulty, alert and oriented x 4, oriented to room and call system, dressing CDI, CMS intact, IV patent and infusing, kennedy patent, rates pain (8 )/10, SCD's on BLE. Jessi ice chips well. Frequent VS started.

## 2023-03-09 NOTE — BRIEF OP NOTE
[facility] Brief Operative Note    Pre-operative diagnosis: Spondylolisthesis of lumbar region [M43.16]  Spinal stenosis of lumbar region with neurogenic claudication [M48.062]  Other idiopathic scoliosis, lumbar region [M41.26]   Post-operative diagnosis L2 to L5 spinal stenosis and listhesis and scoliosis with neurogenic claudication     Procedure: Procedure(s):  LUMBAR TWO TO LUMBAR FIVE OBLIQUE LATERAL LUMBAR INTERBODY FUSION WITH BILATERAL IMPLANTS   Surgeon(s): Surgeon(s) and Role:     * Evin Thompson MD - Primary     * Linda Villarreal PA-C - Assisting   Estimated blood loss: 100 mL    Specimens: * No specimens in log *   Findings: See op note

## 2023-03-09 NOTE — PROGRESS NOTES
Patient vital signs are at baseline: No,  Reason:  On 2L O2 via nasal cannula, ok on RA while awake.  Patient able to ambulate as they were prior to admission or with assist devices provided by therapies during their stay:  No,  Reason:  Not OOB yet  Patient MUST void prior to discharge:  No,  Reason:  Benedict in place until POD #1  Patient able to tolerate oral intake:  Yes  Pain has adequate pain control using Oral analgesics:  Yes  Does patient have an identified :  Yes,  Lionel ([place])  Has goal D/C date and time been discussed with patient:  Yes, pending PT/OT evals and medically stable.     A & Ox4, able to make needs known. Tolerating regular diet. Denies nausea. Large dressing covering incisions on back. Benedict intact, draining adequate urine. Not OOB yet. CMS intact. Denies numbness/tingling in extremities. IV ABX given.

## 2023-03-09 NOTE — ANESTHESIA POSTPROCEDURE EVALUATION
Patient: Angela Martinez    Procedure: Procedure(s):  LUMBAR TWO TO LUMBAR FIVE OBLIQUE LATERAL LUMBAR INTERBODY FUSION WITH BILATERAL IMPLANTS       Anesthesia Type:  General    Note:  Disposition: Admission   Postop Pain Control: Uneventful            Sign Out: ONGOING pain issues (Appears comfortable, rating pain severe with awakening)   PONV: No   Neuro/Psych: Uneventful            Sign Out: Acceptable/Baseline neuro status   Airway/Respiratory: Uneventful            Sign Out: Acceptable/Baseline resp. status   CV/Hemodynamics: Uneventful            Sign Out: Acceptable CV status   Other NRE: NONE   DID A NON-ROUTINE EVENT OCCUR? No           Last vitals:  Vitals Value Taken Time   /73 03/09/23 1330   Temp 97.8  F (36.6  C) 03/09/23 1330   Pulse 79 03/09/23 1334   Resp 11 03/09/23 1334   SpO2 98 % 03/09/23 1334   Vitals shown include unvalidated device data.    Electronically Signed By: Nicolas Nguyen MD  March 9, 2023  3:18 PM

## 2023-03-10 ENCOUNTER — APPOINTMENT (OUTPATIENT)
Dept: PHYSICAL THERAPY | Facility: CLINIC | Age: 78
DRG: 454 | End: 2023-03-10
Attending: ORTHOPAEDIC SURGERY
Payer: MEDICARE

## 2023-03-10 ENCOUNTER — APPOINTMENT (OUTPATIENT)
Dept: OCCUPATIONAL THERAPY | Facility: CLINIC | Age: 78
DRG: 454 | End: 2023-03-10
Payer: MEDICARE

## 2023-03-10 LAB
GLUCOSE SERPL-MCNC: 134 MG/DL (ref 70–99)
HGB BLD-MCNC: 9.3 G/DL (ref 11.7–15.7)

## 2023-03-10 PROCEDURE — 97530 THERAPEUTIC ACTIVITIES: CPT | Mod: GO | Performed by: OCCUPATIONAL THERAPIST

## 2023-03-10 PROCEDURE — 97166 OT EVAL MOD COMPLEX 45 MIN: CPT | Mod: GO | Performed by: OCCUPATIONAL THERAPIST

## 2023-03-10 PROCEDURE — 36415 COLL VENOUS BLD VENIPUNCTURE: CPT | Performed by: ORTHOPAEDIC SURGERY

## 2023-03-10 PROCEDURE — 250N000013 HC RX MED GY IP 250 OP 250 PS 637: Performed by: NURSE PRACTITIONER

## 2023-03-10 PROCEDURE — 99232 SBSQ HOSP IP/OBS MODERATE 35: CPT | Performed by: INTERNAL MEDICINE

## 2023-03-10 PROCEDURE — 97162 PT EVAL MOD COMPLEX 30 MIN: CPT | Mod: GP

## 2023-03-10 PROCEDURE — 120N000001 HC R&B MED SURG/OB

## 2023-03-10 PROCEDURE — 82947 ASSAY GLUCOSE BLOOD QUANT: CPT | Performed by: ORTHOPAEDIC SURGERY

## 2023-03-10 PROCEDURE — 250N000013 HC RX MED GY IP 250 OP 250 PS 637

## 2023-03-10 PROCEDURE — 97530 THERAPEUTIC ACTIVITIES: CPT | Mod: GP

## 2023-03-10 PROCEDURE — 85018 HEMOGLOBIN: CPT

## 2023-03-10 PROCEDURE — 250N000011 HC RX IP 250 OP 636

## 2023-03-10 PROCEDURE — 97110 THERAPEUTIC EXERCISES: CPT | Mod: GP

## 2023-03-10 RX ORDER — ACETAMINOPHEN 325 MG/1
650 TABLET ORAL EVERY 4 HOURS PRN
Qty: 100 TABLET | Refills: 0 | Status: SHIPPED | OUTPATIENT
Start: 2023-03-10 | End: 2023-03-15

## 2023-03-10 RX ORDER — ONDANSETRON 4 MG/1
4-8 TABLET, ORALLY DISINTEGRATING ORAL EVERY 8 HOURS PRN
Qty: 10 TABLET | Refills: 0 | Status: SHIPPED | OUTPATIENT
Start: 2023-03-10 | End: 2023-03-14

## 2023-03-10 RX ADMIN — HYDROXYZINE HYDROCHLORIDE 10 MG: 10 TABLET ORAL at 19:17

## 2023-03-10 RX ADMIN — HYDROXYZINE HYDROCHLORIDE 10 MG: 10 TABLET ORAL at 00:58

## 2023-03-10 RX ADMIN — METHOCARBAMOL 500 MG: 500 TABLET ORAL at 11:42

## 2023-03-10 RX ADMIN — METHOCARBAMOL 500 MG: 500 TABLET ORAL at 06:00

## 2023-03-10 RX ADMIN — METHOCARBAMOL 500 MG: 500 TABLET ORAL at 19:17

## 2023-03-10 RX ADMIN — METHOCARBAMOL 500 MG: 500 TABLET ORAL at 00:58

## 2023-03-10 RX ADMIN — DULOXETINE HYDROCHLORIDE 90 MG: 60 CAPSULE, DELAYED RELEASE ORAL at 10:27

## 2023-03-10 RX ADMIN — CARBIDOPA AND LEVODOPA 1 TABLET: 25; 100 TABLET ORAL at 10:28

## 2023-03-10 RX ADMIN — OXYCODONE HYDROCHLORIDE 5 MG: 5 TABLET ORAL at 17:11

## 2023-03-10 RX ADMIN — HYDROXYZINE HYDROCHLORIDE 10 MG: 10 TABLET ORAL at 11:42

## 2023-03-10 RX ADMIN — PANTOPRAZOLE SODIUM 40 MG: 40 TABLET, DELAYED RELEASE ORAL at 10:28

## 2023-03-10 RX ADMIN — ACETAMINOPHEN 975 MG: 325 TABLET ORAL at 17:10

## 2023-03-10 RX ADMIN — LATANOPROST 1 DROP: 50 SOLUTION/ DROPS OPHTHALMIC at 21:09

## 2023-03-10 RX ADMIN — BUPROPION HYDROCHLORIDE 150 MG: 150 TABLET, EXTENDED RELEASE ORAL at 10:27

## 2023-03-10 RX ADMIN — CARBIDOPA AND LEVODOPA 1 TABLET: 25; 100 TABLET ORAL at 14:34

## 2023-03-10 RX ADMIN — POLYETHYLENE GLYCOL 3350 17 G: 17 POWDER, FOR SOLUTION ORAL at 10:26

## 2023-03-10 RX ADMIN — LOSARTAN POTASSIUM 12.5 MG: 25 TABLET, FILM COATED ORAL at 10:28

## 2023-03-10 RX ADMIN — KETOROLAC TROMETHAMINE 15 MG: 15 INJECTION, SOLUTION INTRAMUSCULAR; INTRAVENOUS at 00:58

## 2023-03-10 RX ADMIN — HYDROXYZINE HYDROCHLORIDE 10 MG: 10 TABLET ORAL at 06:01

## 2023-03-10 RX ADMIN — CARBIDOPA AND LEVODOPA 1 TABLET: 25; 100 TABLET ORAL at 21:08

## 2023-03-10 RX ADMIN — ACETAMINOPHEN 975 MG: 325 TABLET ORAL at 06:03

## 2023-03-10 RX ADMIN — KETOROLAC TROMETHAMINE 15 MG: 15 INJECTION, SOLUTION INTRAMUSCULAR; INTRAVENOUS at 06:01

## 2023-03-10 RX ADMIN — CEFAZOLIN SODIUM 2 G: 2 INJECTION, SOLUTION INTRAVENOUS at 00:58

## 2023-03-10 RX ADMIN — SENNOSIDES AND DOCUSATE SODIUM 2 TABLET: 50; 8.6 TABLET ORAL at 21:08

## 2023-03-10 ASSESSMENT — ACTIVITIES OF DAILY LIVING (ADL)
ADLS_ACUITY_SCORE: 21
ADLS_ACUITY_SCORE: 21
ADLS_ACUITY_SCORE: 25
ADLS_ACUITY_SCORE: 21
ADLS_ACUITY_SCORE: 25
ADLS_ACUITY_SCORE: 21
ADLS_ACUITY_SCORE: 27
ADLS_ACUITY_SCORE: 21
ADLS_ACUITY_SCORE: 21
ADLS_ACUITY_SCORE: 27
ADLS_ACUITY_SCORE: 25
ADLS_ACUITY_SCORE: 25

## 2023-03-10 NOTE — PROGRESS NOTES
03/10/23 1100   Appointment Info   Signing Clinician's Name / Credentials (OT) Danielle Luz, OTRL   Living Environment   People in Home spouse   Current Living Arrangements house   Home Accessibility stairs to enter home   Number of Stairs, Main Entrance 2   Stair Railings, Main Entrance railings safe and in good condition   Transportation Anticipated family or friend will provide   Living Environment Comments Pt lives in house, 2STE then all needs met. Tub shower with cut out so small lip, has shower chair.   Self-Care   Usual Activity Tolerance moderate   Current Activity Tolerance moderate   Regular Exercise No   Equipment Currently Used at Home walker, rolling;cane, straight;grab bar, tub/shower;shower chair   Fall history within last six months yes   Number of times patient has fallen within last six months 2   Activity/Exercise/Self-Care Comment Pt reports using walker somtimes for community, otherwise no AD. Pt reports IND with bathing, toileting; spouse assists with dressing - upper body/bra typically. Spouse assists with cooking/cleaning.   Instrumental Activities of Daily Living (IADL)   IADL Comments Spouse assists with IADLs at baseline   General Information   Onset of Illness/Injury or Date of Surgery 03/09/23   Referring Physician Linda Villarreal PA-C   Patient/Family Therapy Goal Statement (OT) Pt would like to decrease pain   Additional Occupational Profile Info/Pertinent History of Current Problem 78 year old female who is POD #0 s/p L2-5 fusion. She has severe spondylolisthesis at L2-5 with neurogenic claudication and has failed conservative outpatient management so presents here for elective L2-5 fusion. She tolerated the surgery well, pain is well controlled, has had adequate I&Os, and she is tolerating PO intake. Benedict remains in place, she is not passing gas or had a BM yet. She denies fever, chills, chest pain, SOB, cough, abdominal pain, nausea, vomiting, or diarrhea. She also has a  PMHx of Parkinson's disease, HTN and GERD.   Existing Precautions/Restrictions fall;spinal   Limitations/Impairments hearing   Cognitive Status Examination   Orientation Status person;place   Behavioral Issues overwhelmed easily   Affect/Mental Status (Cognitive) anxious;emotionally labile;confused  (Pt appears intermittently tearful during assessment)   Follows Commands follows one-step commands;WFL   Safety Deficit minimal deficit;awareness of need for assistance;insight into deficits/self-awareness;safety precautions follow-through/compliance   Memory Deficit minimal deficit   Attention Deficit minimal deficit   Executive Function Deficit minimal deficit   Cognitive Status Comments Patient has cognitive deficits at baseline per spouse, emotionally labile during session becoming tearful   Sensory   Sensory Comments L thigh numbness since surgery   Pain Assessment   Patient Currently in Pain Yes, see Vital Sign flowsheet   Posture   Posture forward head position;protracted shoulders   Range of Motion Comprehensive   Comment, General Range of Motion Impaired trunk ROM due to precautions, UE and LEWFL   Strength Comprehensive (MMT)   Comment, General Manual Muscle Testing (MMT) Assessment Impaired global strength and poor activity tolerance   Bed Mobility   Comment (Bed Mobility) Max assist   Transfers   Transfer Comments Dependent   Balance   Balance Comments Impaired seated EOB   Activities of Daily Living   BADL Assessment/Intervention bathing;lower body dressing;upper body dressing;grooming;toileting   Bathing Assessment/Intervention   Comment, (Bathing) Dependent   Upper Body Dressing Assessment/Training   Comment, (Upper Body Dressing) Mod assist with precautions   Lower Body Dressing Assessment/Training   Comment, (Lower Body Dressing) Max candace with precautions   Grooming Assessment/Training   Comment, (Grooming) Impaired tolerance for activity in standing   Toileting   Comment, (Toileting) Dependent    Clinical Impression   Criteria for Skilled Therapeutic Interventions Met (OT) Yes, treatment indicated   OT Diagnosis Decline in ADL independence and safety   Influenced by the following impairments Lumbar fusion   OT Problem List-Impairments impacting ADL problems related to;activity tolerance impaired;balance;cognition;coordination;mobility;range of motion (ROM);strength;pain;post-surgical precautions   Assessment of Occupational Performance 5 or more Performance Deficits   Identified Performance Deficits Impaired dressing bathing toileting grooming and moblity with cognitive decline   Planned Therapy Interventions (OT) ADL retraining;cognition;progressive activity/exercise   Clinical Decision Making Complexity (OT) moderate complexity   Anticipated Equipment Needs Upon Discharge (OT)   (TBD)   Risk & Benefits of therapy have been explained evaluation/treatment results reviewed;care plan/treatment goals reviewed;current/potential barriers reviewed;risks/benefits reviewed;participants voiced agreement with care plan;participants included;patient;spouse/significant other   OT Total Evaluation Time   OT Eval, Moderate Complexity Minutes (44095) 9   OT Goals   Therapy Frequency (OT) 5 times/wk   OT Predicted Duration/Target Date for Goal Attainment 03/16/23   OT Goals Hygiene/Grooming;Upper Body Dressing;Lower Body Dressing;Toilet Transfer/Toileting;Cognition   OT: Hygiene/Grooming modified independent;while standing   OT: Upper Body Dressing Modified independent;using adaptive equipment;within precautions;including set-up/clothing retrieval;including orthotic   OT: Lower Body Dressing Modified independent;using adaptive equipment;including set-up/clothing retrieval;within precautions   OT: Toilet Transfer/Toileting Moderate assist;toilet transfer;using adaptive equipment;cleaning and garment management;within precautions   OT: Cognitive Patient/caregiver will verbalize understanding of cognitive assessment  results/recommendations as needed for safe discharge planning   Interventions   Interventions Quick Adds Therapeutic Activity   Therapeutic Activities   Therapeutic Activity Minutes (90948) 33   Symptoms noted during/after treatment fatigue;increased pain   Treatment Detail/Skilled Intervention OT; Pt supine in bed, agreeable to therapy but requires redirection due to emotional lability and crying due to current condition/pain. Pt offered education on therapy plan, typical pain management routine and TCU to encourage pt that progress is possible, pt agreeable to mobility. Pt educated on log roll, supine to sit with max assist and repeated cues. Pt reporting pain and demonstrtes retropulsion EOB. Pt educated on donning brace, requires max candace due to retropulsion this date. Pt cued to weight shift forward requiring repeated cues for posture. Attempted stand with pt reporting significant pain impulsively layin down requiring max assist x2 to position to protect skin and joint integrity. Pt and caregiver educated on LE positioning and AE for dressing, EOB pt able to don one sock before laying down. Pt supine in ebd with alarm on and bharat llight upon OT departure   OT Discharge Planning   OT Plan OT: LB dressing with AE, sit to stand, commode/BSC transfers   OT Discharge Recommendation (DC Rec) Transitional Care Facility   OT Rationale for DC Rec Pt presents significantly below baseline with ADLs and mobility requiring TCU to return to prior level of function   OT Brief overview of current status Max assist bed mobility, poor sitting tolerance

## 2023-03-10 NOTE — PROGRESS NOTES
03/10/23 0830   Appointment Info   Signing Clinician's Name / Credentials (PT) Loreta Rios DPT   Living Environment   People in Home spouse   Current Living Arrangements house   Home Accessibility stairs to enter home   Number of Stairs, Main Entrance 2   Stair Railings, Main Entrance railings safe and in good condition  (grab bars)   Transportation Anticipated family or friend will provide   Living Environment Comments Pt lives in house, 2STE then all needs met. Tub shower with cut out so small lip, has shower chair.   Self-Care   Usual Activity Tolerance moderate   Current Activity Tolerance moderate   Regular Exercise No   Equipment Currently Used at Home walker, rolling;cane, straight;grab bar, tub/shower;shower chair   Fall history within last six months yes   Number of times patient has fallen within last six months 2   Activity/Exercise/Self-Care Comment Pt reports using walker somtimes for community, [facility] no AD. Pt reports IND with bathing, toileting; spouse assists with dressing - upper body/bra typically. Spouse assists with cooking/cleaning.   General Information   Onset of Illness/Injury or Date of Surgery 03/09/23   Referring Physician Linda Villarreal PA-C   Patient/Family Therapy Goals Statement (PT) return home   Pertinent History of Current Problem (include personal factors and/or comorbidities that impact the POC) 78 year old female who is POD #0 s/p L2-5 fusion.   Existing Precautions/Restrictions fall;brace worn when out of bed;spinal   Cognition   Affect/Mental Status (Cognition) WNL   Orientation Status (Cognition) oriented x 4   Follows Commands (Cognition) WNL   Pain Assessment   Patient Currently in Pain No   Integumentary/Edema   Integumentary/Edema Comments age related changes   Posture    Posture Forward head position;Protracted shoulders   Range of Motion (ROM)   ROM Comment spinal precautions, BLEs WFL   Strength (Manual Muscle Testing)   Strength (Manual Muscle Testing)  Deficits observed during functional mobility   Strength Comments unable to SLR with LLE; at least 3/5 with functional mobility   Bed Mobility   Comment, (Bed Mobility) supine<>sit with max A   Transfers   Comment, (Transfers) sit<>stand with FWW and mod A   Gait/Stairs (Locomotion)   Comment, (Gait/Stairs) able to take a few very small steps with minimal foot clearance and FWW and mod A; very unsteady, posterior lean   Balance   Balance Comments significantly impaired; heavy posterior lean, unsteady, requiring FWW and mod A in standing   Sensory Examination   Sensory Perception patient reports no sensory changes   Clinical Impression   Criteria for Skilled Therapeutic Intervention Yes, treatment indicated   PT Diagnosis (PT) impaired functional mobility   Influenced by the following impairments weakness, impaired balance, deconditioning   Functional limitations due to impairments difficulty with bed mobility, transfers, ambulation   Clinical Presentation (PT Evaluation Complexity) Evolving/Changing   Clinical Presentation Rationale clinical judgement   Clinical Decision Making (Complexity) moderate complexity   Planned Therapy Interventions (PT) balance training;bed mobility training;gait training;home exercise program;neuromuscular re-education;patient/family education;ROM (range of motion);strengthening;transfer training;progressive activity/exercise;risk factor education;home program guidelines   Anticipated Equipment Needs at Discharge (PT) walker, standard   Risk & Benefits of therapy have been explained evaluation/treatment results reviewed;care plan/treatment goals reviewed;risks/benefits reviewed;current/potential barriers reviewed;participants voiced agreement with care plan;participants included;patient   PT Total Evaluation Time   PT Eval, Moderate Complexity Minutes (43654) 10   Plan of Care Review   Plan of Care Reviewed With patient   Physical Therapy Goals   PT Frequency 2x/day   PT Predicted  Duration/Target Date for Goal Attainment 03/14/23   PT Goals Bed Mobility;Transfers;Gait   PT: Bed Mobility Supervision/stand-by assist;Supine to/from sit;Within precautions   PT: Transfers Supervision/stand-by assist;Assistive device;Within precautions   PT: Gait Supervision/stand-by assist;Assistive device;100 feet;Within precautions   PT Discharge Planning   PT Plan PT: review precautions, progress all mobility as able   PT Discharge Recommendation (DC Rec) Transitional Care Facility   PT Rationale for DC Rec Pt is significantly below baseline functional mobility. Pt reports IND with transfers and short distance ambulation at baseline; reports was mainly limited by pain. Pt currently requiring up to maximum assist with basic bed mobility and transfers, limited to only taking a few very unsteady steps with walker; pt is a high falls risk. Currently pt does not demonstrate mobility needed for home with assist of spouse. Recommending TCU for progression of strength and functional mobility; pt and spouse hopeful that pt can discharge home.   PT Brief overview of current status SS A x 2 for nursing   Total Session Time   Total Session Time (sum of timed and untimed services) 10

## 2023-03-10 NOTE — PROGRESS NOTES
"[facility]  Hospitalist Progress Note     Assessment & Plan     ASSESSMENT    78F with history of hypertension, Parkinson's disease, morbid obesity BMI 41, and lumbar spinal stenosis with spondylolisthesis c/b neurogenic claudication presents for scheduled lumbar fusion and now status post this procedure without immediate complications.  Hospitalist service consulted for management of chronic conditions.  No acute medical issues at this time but appears at TCU was recommended by therapies so we will discuss this with patient and social work.    PLAN    Lumbar Spinal Stenosis with Spondylolisthesis s/p L2-5 Fusion  -Hx of lumbar spinal stenosis with spondylolisthesis c/b neurogenic claudication   -Went for scheduled lumbar fusion with Dr. Thompson 03/09  -Pain control per primary team  -PT/OT following    Parkinson's disease  -Continue home Sinemet    Essential hypertension  -Denies lightheadedness or dizziness with ambulation so will resume home losartan    Acute blood loss anemia  -Secondary to surgery  -Transfuse for hemoglobin less than 7    Morbid obesity BMI 41  -Complicates all aspects of care    DVT Prophy  -Per primary team    Disposition  -TCU.  Discussed with social work who will see patient and send referrals      Rolando Boyer MD    Subjective     Patient seen at bedside.  Pain controlled.  No lightheadedness or dizziness when standing. Did not not do very well with therapies and TCU being recommended at this time.        Objective   Blood pressure 136/45, pulse 93, temperature 97.8  F (36.6  C), temperature source Temporal, resp. rate 16, height 1.499 m (4' 11\"), weight 93.3 kg (205 lb 11.2 oz), SpO2 96 %.    PHYSICAL EXAM  General: In no acute distress  CV: RRR.  Lungs: CTAB. Nl WOB.  Abd: Non-tender.  MSK: post-op site c/d/i.  Ext: No edema.    LABS AND IMAGING  Reviewed and pertinent results discussed in assessment and plan.   "

## 2023-03-10 NOTE — PLAN OF CARE
Patient vital signs are at baseline: Yes  Patient able to ambulate as they were prior to admission or with assist devices provided by therapies during their stay:  No,  Reason:  pt was sitting on the edge of bed x1  Patient MUST void prior to discharge:  No,  Reason:  due to void Benedict removed  Patient able to tolerate oral intake:  Yes  Pain has adequate pain control using Oral analgesics:  no, pt still on IV Toradol  Does patient have an identified :  Yes ()  Has goal D/C date and time been discussed with patient:  No,  Reason:  pt wants to go home with outpatient PT, OT orders.

## 2023-03-10 NOTE — PROGRESS NOTES
Vitals stable.   Doing well.  Neuro intact.   Preop leg pain gone.  Should be able to go home today after PT.

## 2023-03-10 NOTE — CONSULTS
Care Management Initial Consult    General Information  Assessment completed with: Patient, Spouse or significant other, Lionel Miller  Type of CM/SW Visit: Offer D/C Planning    Primary Care Provider verified and updated as needed: Yes   Readmission within the last 30 days:     Reason for Consult: discharge planning  Advance Care Planning: Advance Care Planning Reviewed: no concerns identified        Communication Assessment  Patient's communication style: spoken language (English or Bilingual)    Hearing Difficulty or Deaf: no   Wear Glasses or Blind: yes    Cognitive  Cognitive/Neuro/Behavioral: .WDL except  Level of Consciousness: alert  Arousal Level: opens eyes spontaneously  Orientation: oriented x 4  Mood/Behavior: behavior appropriate to situation  Best Language: 0 - No aphasia  Speech: clear, spontaneous    Living Environment:   People in home: spouse     Current living Arrangements: house      Able to return to prior arrangements: yes     Family/Social Support:  Care provided by: self  Provides care for: no one  Marital Status:     Lionel       Description of Support System: Supportive, Involved    Support Assessment: Adequate family and caregiver support, Adequate social supports    Current Resources:   Patient receiving home care services:       Community Resources:    Equipment currently used at home: walker, rolling, cane, straight, grab bar, tub/shower, shower chair  Supplies currently used at home:      Employment/Financial:  Employment Status: retired     Financial Concerns: insurance, none   Referral to Financial Worker: No    Lifestyle & Psychosocial Needs:  Social Determinants of Health     Tobacco Use: Low Risk      Smoking Tobacco Use: Never     Smokeless Tobacco Use: Never     Passive Exposure: Not on file   Alcohol Use: Not on file   Financial Resource Strain: Not on file   Food Insecurity: Not on file   Transportation Needs: Not on file   Physical Activity: Not on file    Stress: Not on file   Social Connections: Not on file   Intimate Partner Violence: Not on file   Depression: Not on file   Housing Stability: Not on file     Functional Status:  Prior to admission patient needed assistance: Patient was admitted for spinal fusion, spinal surgery, pain, PT following.     SW met with pt and spouse at baseline. Pt lives in a home in [facility] with spouse. Pt is independent with ADLs and ambulation at baseline.  Pt has a walker that she uses for longer distances as needed.     PT evaluated pt and recommend TCU. Pt is agreeable. Reviewed Medicare.gov ratings. Pt requesting that referrals be sent to: [facility], [facility], [facility], and [facility] Swing bed in [facility]. SW discussed private room fee. Pt is wanting a private room but not agreeable to the private room fee. She wants more information on if/what the private room fees are for the [place] facilities.      Mental Health Status:  Mental Health Status: No Current Concerns       Chemical Dependency Status:  Chemical Dependency Status: No Current Concerns           Values/Beliefs:  Spiritual, Cultural Beliefs, Anabaptism Practices, Values that affect care: yes  Description of Beliefs that Will Affect Care: Ramonita          Additional Information:  Plan: TCU, referrals sent. Semi-prvt vs prvt room preference based on private room fees at above facilities. Spouse will transport at discharge. SW will continue to follow.     JUDI Oliver, [facility]   Inpatient Care Coordination  [facility]   [phone]

## 2023-03-10 NOTE — DISCHARGE SUMMARY
Discharge Summary    Attending Physician:  Evin Thompson MD  Admit Date: 3/9/2023    Discharge Date: 3/15/2023  Primary Care Physician: Carolina Westbrook    Discharge Diagnoses  S/p fusion of lumbar spine    Discharge Exam    AAOx3, NAD  Moving all extremities   no weakness, No new sensory deficit, incision C/D/I    Preliminary Discharge Medications    This list of medications is preliminary and tentative.  Please see the After Visit Summary for the final and accurate medication list.       Review of your medicines        START taking        Dose / Directions   methocarbamol 500 MG tablet  Commonly known as: ROBAXIN  Used for: Acute post-operative pain      Dose: 500 mg  Take 1 tablet (500 mg) by mouth 3 times daily for 15 days  Quantity: 45 tablet  Refills: 1     naloxone 4 MG/0.1ML nasal spray  Commonly known as: NARCAN  Used for: Acute post-operative pain      Dose: 4 mg  Spray 1 spray (4 mg) into one nostril alternating nostrils as needed for opioid reversal every 2-3 minutes until assistance arrives  Quantity: 0.2 mL  Refills: 1     oxyCODONE 5 MG tablet  Commonly known as: ROXICODONE  Used for: Acute post-operative pain      Take 1-2 tabs PO q4-6h as needed for pain for 4 days.  Quantity: 40 tablet  Refills: 0            CONTINUE these medicines which may have CHANGED, or have new prescriptions. If we are uncertain of the size of tablets/capsules you have at home, strength may be listed as something that might have changed.        Dose / Directions   * acetaminophen 325 MG tablet  Commonly known as: TYLENOL  This may have changed: Another medication with the same name was added. Make sure you understand how and when to take each.  Used for: Status post lumbar spine operation      Dose: 650 mg  Take 2 tablets (650 mg) by mouth every 4 hours as needed for mild pain  Quantity: 50 tablet  Refills: 0     * acetaminophen 325 MG tablet  Commonly known as: TYLENOL  This may have changed: You were already taking a  medication with the same name, and this prescription was added. Make sure you understand how and when to take each.  Used for: Acute post-operative pain      Dose: 650 mg  Take 2 tablets (650 mg) by mouth every 4 hours as needed for other (mild pain)  Quantity: 100 tablet  Refills: 0     * ondansetron 4 MG ODT tab  Commonly known as: ZOFRAN ODT  This may have changed: Another medication with the same name was added. Make sure you understand how and when to take each.  Used for: Status post lumbar spine operation      Dose: 4-8 mg  Take 1-2 tablets (4-8 mg) by mouth every 8 hours as needed for nausea  Quantity: 20 tablet  Refills: 0     * ondansetron 4 MG ODT tab  Commonly known as: ZOFRAN ODT  This may have changed: You were already taking a medication with the same name, and this prescription was added. Make sure you understand how and when to take each.  Used for: Acute post-operative pain      Dose: 4-8 mg  Take 1-2 tablets (4-8 mg) by mouth every 8 hours as needed for nausea Dissolve ON the tongue.  Quantity: 10 tablet  Refills: 0           * This list has 4 medication(s) that are the same as other medications prescribed for you. Read the directions carefully, and ask your doctor or other care provider to review them with you.                CONTINUE these medicines which have NOT CHANGED        Dose / Directions   buPROPion 150 MG 24 hr tablet  Commonly known as: WELLBUTRIN XL      Dose: 1 tablet  Take 1 tablet by mouth daily  Refills: 0     carbidopa-levodopa  MG tablet  Commonly known as: SINEMET      Dose: 1 tablet  Take 1 tablet by mouth 3 times daily  Refills: 0     DULoxetine 30 MG capsule  Commonly known as: CYMBALTA      Dose: 90 mg  Take 90 mg by mouth daily  Refills: 0     ferrous sulfate 325 (65 Fe) MG EC tablet  Commonly known as: FE TABS      Dose: 1 tablet  Take 1 tablet by mouth daily  Refills: 0     latanoprost 0.005 % ophthalmic solution  Commonly known as: XALATAN      Dose: 1  drop  Place 1 drop into both eyes At Bedtime  Refills: 0     losartan 25 MG tablet  Commonly known as: COZAAR      Dose: 12.5 mg  Take 12.5 mg by mouth daily  Refills: 0     metroNIDAZOLE 0.75 % external cream  Commonly known as: METROCREAM      Apply topically 2 times daily as needed Apply to face.  Refills: 0     omeprazole 20 MG DR capsule  Commonly known as: priLOSEC      Dose: 20 mg  Take 20 mg by mouth daily  Refills: 0     Saxenda 18 MG/3ML pen  Generic drug: liraglutide - Weight Management      Dose: 0.6 mg  Inject 0.6 mg Subcutaneous daily  Refills: 0               Where to get your medicines        These medications were sent to [place], [place] - [address] [facility]  [address] [facility] [address]      Phone: [phone]   acetaminophen 325 MG tablet  naloxone 4 MG/0.1ML nasal spray  ondansetron 4 MG ODT tab       Some of these will need a paper prescription and others can be bought over the counter. Ask your nurse if you have questions.    Bring a paper prescription for each of these medications  methocarbamol 500 MG tablet  oxyCODONE 5 MG tablet           Procedures Performed and Findings  Procedure(s):  L2 L5  oblique lateral lumbar interbody fusion with discectomy L2 L5 Posterior minimally invasive pedicle screw placement and posterolateral instrumentation and fusion       Consultations Obtained  CARE MANAGEMENT / SOCIAL WORK IP CONSULT  HOSPITALIST IP CONSULT  OCCUPATIONAL THERAPY ADULT IP CONSULT  PHYSICAL THERAPY ADULT IP CONSULT  PAIN MANAGEMENT ADULT IP CONSULT    Code Status   Full Code    Discharge Disposition   Rehab    Diet on Discharge  Regular      Discharge Instructions  All info available on [facility] website as well.    Activity:  No climbing, crawling, kneeling or over the shoulder activities for 3 months  No lifting more than:   Month 1: 8 pounds  Month 2: 16 Pounds  Month 4: 25 Pounds  Month 6: 35 Pounds    Pain and management:  Back  pain is expected after surgery, typically in the lower back and can seem to radiate into the hips. This is normal post-operative pain. Please inform us however, if your pain is gradually worsening.    Can take tylenol in addition to opioids for pain management. Please do not take NSAIDs for first 3 months.    Call the RX line for medication refills. Leave a voicemail.    Incision care:  Depending on the type of surgery, you may have staples, sutures, steri-strips, or likely a combination of these to help close your incision post operatively. Steri-strips are small stickers placed over the wound; please allow these to remain in place until they fall off on their own. Staples should be taken out within 2 weeks post op either with your primary care doctor or at our facility.    Often a bandage is placed over the incision site after surgery. This is left on for 6 or 7 days, then should be removed. After, gauze or bandages are usually not necessary. We ask that you leave the site open to air unless you find that there is increased sweat or moisture in the area, and then please apply an antibiotic ointment and dressing 2 times per day until the environment is not moist or the 3 weeks have passed since surgery. Please remember that dark and moist areas promote bacterial growth.      Follow-Up Scheduled    Follow up in 1-2 weeks with PCP for wound check. Follow up with [facility] in 1 month for post op appointment.    Hospital Course  Hospital Course unremarkable, pain controlled, cleared by PT/OT to go to transitional care, no events.        CC Ligandal

## 2023-03-10 NOTE — PLAN OF CARE
A&Ox4; forgetful. IV SL. Lungs clear. Numbness on L thigh. Drsg on back CDI. Passing gas; bowel sounds hypoactive.     Patient vital signs are at baseline: Yes  Patient able to ambulate as they were prior to admission or with assist devices provided by therapies during their stay:  No,  Reason:  Ax2 using the elida steady with the gait belt on and the back brace on when OOB.   Patient MUST void prior to discharge:  Yes, voiding in the bathroom.   Patient able to tolerate oral intake:  Yes, tolerating a regular diet.   Pain has adequate pain control using Oral analgesics:  Yes, taking scheduled Robaxin and atarax and tylenol.   Does patient have an identified :  No,  Reason:  SW working on TCU placement.  Has goal D/C date and time been discussed with patient:  No,  Reason:  SW working on TCU placement.      Goal Outcome Evaluation:      Plan of Care Reviewed With: patient    Overall Patient Progress: improving    Outcome Evaluation: Discharge pending based on PT evaluation; TCU reccomneded; SW working on placement.

## 2023-03-11 ENCOUNTER — APPOINTMENT (OUTPATIENT)
Dept: PHYSICAL THERAPY | Facility: CLINIC | Age: 78
DRG: 454 | End: 2023-03-11
Attending: ORTHOPAEDIC SURGERY
Payer: MEDICARE

## 2023-03-11 LAB
ANION GAP SERPL CALCULATED.3IONS-SCNC: 8 MMOL/L (ref 7–15)
BUN SERPL-MCNC: 15 MG/DL (ref 8–23)
CALCIUM SERPL-MCNC: 8.5 MG/DL (ref 8.8–10.2)
CHLORIDE SERPL-SCNC: 105 MMOL/L (ref 98–107)
CREAT SERPL-MCNC: 0.69 MG/DL (ref 0.51–0.95)
DEPRECATED HCO3 PLAS-SCNC: 26 MMOL/L (ref 22–29)
ERYTHROCYTE [DISTWIDTH] IN BLOOD BY AUTOMATED COUNT: 18.9 % (ref 10–15)
GFR SERPL CREATININE-BSD FRML MDRD: 88 ML/MIN/1.73M2
GLUCOSE SERPL-MCNC: 134 MG/DL (ref 70–99)
HCT VFR BLD AUTO: 30.2 % (ref 35–47)
HGB BLD-MCNC: 9.3 G/DL (ref 11.7–15.7)
MCH RBC QN AUTO: 28.2 PG (ref 26.5–33)
MCHC RBC AUTO-ENTMCNC: 30.8 G/DL (ref 31.5–36.5)
MCV RBC AUTO: 92 FL (ref 78–100)
PLATELET # BLD AUTO: 154 10E3/UL (ref 150–450)
POTASSIUM SERPL-SCNC: 4.5 MMOL/L (ref 3.4–5.3)
RBC # BLD AUTO: 3.3 10E6/UL (ref 3.8–5.2)
SODIUM SERPL-SCNC: 139 MMOL/L (ref 136–145)
WBC # BLD AUTO: 10 10E3/UL (ref 4–11)

## 2023-03-11 PROCEDURE — 36415 COLL VENOUS BLD VENIPUNCTURE: CPT | Performed by: INTERNAL MEDICINE

## 2023-03-11 PROCEDURE — 97110 THERAPEUTIC EXERCISES: CPT | Mod: GP

## 2023-03-11 PROCEDURE — 99233 SBSQ HOSP IP/OBS HIGH 50: CPT | Performed by: INTERNAL MEDICINE

## 2023-03-11 PROCEDURE — 250N000013 HC RX MED GY IP 250 OP 250 PS 637

## 2023-03-11 PROCEDURE — 82310 ASSAY OF CALCIUM: CPT | Performed by: INTERNAL MEDICINE

## 2023-03-11 PROCEDURE — 85027 COMPLETE CBC AUTOMATED: CPT | Performed by: INTERNAL MEDICINE

## 2023-03-11 PROCEDURE — 97530 THERAPEUTIC ACTIVITIES: CPT | Mod: GP

## 2023-03-11 PROCEDURE — 120N000001 HC R&B MED SURG/OB

## 2023-03-11 PROCEDURE — 250N000013 HC RX MED GY IP 250 OP 250 PS 637: Performed by: NURSE PRACTITIONER

## 2023-03-11 RX ADMIN — CARBIDOPA AND LEVODOPA 1 TABLET: 25; 100 TABLET ORAL at 14:51

## 2023-03-11 RX ADMIN — HYDROXYZINE HYDROCHLORIDE 10 MG: 10 TABLET ORAL at 14:50

## 2023-03-11 RX ADMIN — ACETAMINOPHEN 975 MG: 325 TABLET ORAL at 11:52

## 2023-03-11 RX ADMIN — OXYCODONE HYDROCHLORIDE 5 MG: 5 TABLET ORAL at 06:59

## 2023-03-11 RX ADMIN — METHOCARBAMOL 500 MG: 500 TABLET ORAL at 22:14

## 2023-03-11 RX ADMIN — ACETAMINOPHEN 975 MG: 325 TABLET ORAL at 01:28

## 2023-03-11 RX ADMIN — SENNOSIDES AND DOCUSATE SODIUM 2 TABLET: 50; 8.6 TABLET ORAL at 08:11

## 2023-03-11 RX ADMIN — POLYETHYLENE GLYCOL 3350 17 G: 17 POWDER, FOR SOLUTION ORAL at 08:22

## 2023-03-11 RX ADMIN — SENNOSIDES AND DOCUSATE SODIUM 2 TABLET: 50; 8.6 TABLET ORAL at 20:25

## 2023-03-11 RX ADMIN — LOSARTAN POTASSIUM 12.5 MG: 25 TABLET, FILM COATED ORAL at 08:11

## 2023-03-11 RX ADMIN — METHOCARBAMOL 500 MG: 500 TABLET ORAL at 16:33

## 2023-03-11 RX ADMIN — ACETAMINOPHEN 975 MG: 325 TABLET ORAL at 18:34

## 2023-03-11 RX ADMIN — HYDROXYZINE HYDROCHLORIDE 10 MG: 10 TABLET ORAL at 20:25

## 2023-03-11 RX ADMIN — CARBIDOPA AND LEVODOPA 1 TABLET: 25; 100 TABLET ORAL at 20:25

## 2023-03-11 RX ADMIN — METHOCARBAMOL 500 MG: 500 TABLET ORAL at 01:28

## 2023-03-11 RX ADMIN — HYDROXYZINE HYDROCHLORIDE 10 MG: 10 TABLET ORAL at 08:11

## 2023-03-11 RX ADMIN — METHOCARBAMOL 500 MG: 500 TABLET ORAL at 08:11

## 2023-03-11 RX ADMIN — OXYCODONE HYDROCHLORIDE 5 MG: 5 TABLET ORAL at 14:51

## 2023-03-11 RX ADMIN — CARBIDOPA AND LEVODOPA 1 TABLET: 25; 100 TABLET ORAL at 08:10

## 2023-03-11 RX ADMIN — LATANOPROST 1 DROP: 50 SOLUTION/ DROPS OPHTHALMIC at 22:14

## 2023-03-11 RX ADMIN — BUPROPION HYDROCHLORIDE 150 MG: 150 TABLET, EXTENDED RELEASE ORAL at 08:11

## 2023-03-11 RX ADMIN — HYDROXYZINE HYDROCHLORIDE 10 MG: 10 TABLET ORAL at 01:28

## 2023-03-11 RX ADMIN — OXYCODONE HYDROCHLORIDE 5 MG: 5 TABLET ORAL at 03:55

## 2023-03-11 RX ADMIN — DULOXETINE HYDROCHLORIDE 90 MG: 60 CAPSULE, DELAYED RELEASE ORAL at 08:11

## 2023-03-11 RX ADMIN — PANTOPRAZOLE SODIUM 40 MG: 40 TABLET, DELAYED RELEASE ORAL at 08:11

## 2023-03-11 ASSESSMENT — ACTIVITIES OF DAILY LIVING (ADL)
ADLS_ACUITY_SCORE: 27
ADLS_ACUITY_SCORE: 28
ADLS_ACUITY_SCORE: 36
ADLS_ACUITY_SCORE: 27
ADLS_ACUITY_SCORE: 27
ADLS_ACUITY_SCORE: 28
ADLS_ACUITY_SCORE: 27
ADLS_ACUITY_SCORE: 27
ADLS_ACUITY_SCORE: 28
ADLS_ACUITY_SCORE: 28
ADLS_ACUITY_SCORE: 27
ADLS_ACUITY_SCORE: 27

## 2023-03-11 NOTE — PLAN OF CARE
Goal Outcome Evaluation:      Plan of Care Reviewed With: patient    3pm-11pm RN    Patient VS are at baseline: Yes  Patient able to ambulate as they were prior to admission or with assist devices provided by therapy during their stay: No is using the elida steady with assist of two  Patient must void prior to discharge: Yes  Patient able to tolerate oral intake: Yes  Patient has adequate pain control using oral analgesics: Yes    Patient using oxycodone, atarax, Robaxin and tylenol for pain management. Up using the elida steady wearing the back brace, dressing on back CDI. Plan is to discharge to TCU.

## 2023-03-11 NOTE — PROGRESS NOTES
Vitals stable.  Hgb 9.3.  Not have ambulated yet.  Neuro intact.  No leg pain.  TCU transfer to [place] Monday most likely.  PT and OT for now.

## 2023-03-11 NOTE — PROGRESS NOTES
[facility]  Hospitalist Progress Note  Donte Renteria M.D., M.B.A.   03/11/2023    Reason for Stay/active problem list      L2-5 fusion         Assessment and Plan:        Summary of Stay:    78F with history of hypertension, Parkinson's disease, morbid obesity BMI 41, and lumbar spinal stenosis with spondylolisthesis c/b neurogenic claudication presents for scheduled lumbar fusion and now status post this procedure without immediate complications.  Hospitalist service consulted for management of chronic conditions.  No acute medical issues at this time but appears at TCU was recommended by therapies so we will discuss this with patient and social work.    Problem List with Assessment and Plan:    Lumbar Spinal Stenosis with Spondylolisthesis s/p L2-5 Fusion  -Hx of lumbar spinal stenosis with spondylolisthesis c/b neurogenic claudication   -Went for scheduled lumbar fusion with Dr. Thompson 03/09  -Pain control per primary team  -PT/OT following     Parkinson's disease  -Continue home Sinemet     Essential hypertension  -Denies lightheadedness or dizziness with ambulation so will resume home losartan     Acute blood loss anemia  -Secondary to surgery  -Transfuse for hemoglobin less than 7     Morbid obesity BMI 41  -Complicates all aspects of care     DVT Prophy  -Per primary team     Disposition  -TCU in [place] likely on Monday          Interval History (Subjective):        Patient is seen and examined by me today and medical record reviewed.Overnight events noted and care discussed with nursing staff.  Patient's care assumed by me today.  She is doing very well without any complaints.  No fever, chest pain or shortness of breath.  She is doing very well except for bilateral lower extremity weakness following surgery.  Concern was discussed with bedside nurse and primary team to address as needed.                  Physical Exam:        Last Vital Signs:  /54 (BP Location: Left arm,  "Patient Position: Semi-Hidalgo's)   Pulse 87   Temp 98.6  F (37  C) (Temporal)   Resp 18   Ht 1.499 m (4' 11\")   Wt 93.3 kg (205 lb 11.2 oz)   SpO2 93%   BMI 41.55 kg/m      I/O last 3 completed shifts:  In: 240 [P.O.:240]  Out: 1800 [Urine:1800]    Wt Readings from Last 5 Encounters:   03/09/23 93.3 kg (205 lb 11.2 oz)   12/09/21 97.6 kg (215 lb 1.6 oz)        Constitutional: Awake, alert, cooperative, no apparent distress     Respiratory: Clear to auscultation bilaterally, no crackles or wheezing   Cardiovascular: Regular rate and rhythm, normal S1 and S2, and no murmur noted   Abdomen: Normal bowel sounds, soft, non-distended, non-tender   Skin: No new rashes, no cyanosis, dry to touch   Neuro: Alert with  no new focal weakness   Extremities: No edema   Other(s):        All other systems: Negative          Medications:        All current medications were reviewed with changes reflected in problem list.         Data:      All new lab and imaging data was reviewed.      Data reviewed today: I reviewed all new labs and imaging results over the last 24 hours. I personally reviewed       Recent Labs   Lab 03/11/23  0824 03/10/23  0728 03/09/23  0629   WBC 10.0  --   --    HGB 9.3* 9.3* 11.8   HCT 30.2*  --   --    MCV 92  --   --      --   --      No results for input(s): CULT in the last 168 hours.  Recent Labs   Lab 03/11/23  0824 03/10/23  0728     --    POTASSIUM 4.5  --    CHLORIDE 105  --    CO2 26  --    ANIONGAP 8  --    * 134*   BUN 15.0  --    CR 0.69  --    GFRESTIMATED 88  --    RAUL 8.5*  --        Recent Labs   Lab 03/11/23  0824 03/10/23  0728   * 134*       No results for input(s): INR in the last 168 hours.      No results for input(s): TROPONIN, TROPI, TROPR in the last 168 hours.    Invalid input(s): TROP, TROPONINIES    No results found for this or any previous visit (from the past 48 hour(s)).    COVID Status:  COVID-19 PCR Results    COVID-19 PCR Results 11/17/21 " 12/5/21   COVID-19 Virus by PCR (External Result) Negative    SARS CoV2 PCR  Negative      Comments are available for some flowsheets but are not being displayed.         COVID-19 Antibody Results, Testing for Immunity    COVID-19 Antibody Results, Testing for Immunity   No data to display.              Disclaimer: This note consists of symbols derived from keyboarding, dictation and/or voice recognition software. As a result, there may be errors in the script that have gone undetected. Please consider this when interpreting information found in this chart.

## 2023-03-11 NOTE — PLAN OF CARE
"Care from [id number]    Blood pressure (!) 115/37, pulse 97, temperature 97.9  F (36.6  C), temperature source Temporal, resp. rate 16, height 1.499 m (4' 11\"), weight 93.3 kg (205 lb 11.2 oz), SpO2 92 %.    Admit Date: 3/9/2023  Admitting Diagnosis: L 2-5 OLIF  Neuro: Alert and Oriented x4  Activity: are 2 assist with Elida Steady  Telemetry Monitoring: No  Pain: complaining of 7/10 pain in their Back.  Tylenol, Oxycodone, Atarax and Robaxin given for pain.  Labs / Tests: Hgb, Blood sugar for POD2  GI: passing gas, No BM  : WNL. Pt voided in the restroom using the elida steady.  Fluids: is Saline locked.  Diet: Regular  Consults:   Treatment:  Discharge Disposition: to TCU in [place].      "

## 2023-03-12 ENCOUNTER — APPOINTMENT (OUTPATIENT)
Dept: PHYSICAL THERAPY | Facility: CLINIC | Age: 78
DRG: 454 | End: 2023-03-12
Attending: ORTHOPAEDIC SURGERY
Payer: MEDICARE

## 2023-03-12 ENCOUNTER — APPOINTMENT (OUTPATIENT)
Dept: OCCUPATIONAL THERAPY | Facility: CLINIC | Age: 78
DRG: 454 | End: 2023-03-12
Attending: ORTHOPAEDIC SURGERY
Payer: MEDICARE

## 2023-03-12 LAB — HGB BLD-MCNC: 8.5 G/DL (ref 11.7–15.7)

## 2023-03-12 PROCEDURE — 36415 COLL VENOUS BLD VENIPUNCTURE: CPT | Performed by: INTERNAL MEDICINE

## 2023-03-12 PROCEDURE — 99232 SBSQ HOSP IP/OBS MODERATE 35: CPT | Performed by: INTERNAL MEDICINE

## 2023-03-12 PROCEDURE — 250N000013 HC RX MED GY IP 250 OP 250 PS 637

## 2023-03-12 PROCEDURE — 97116 GAIT TRAINING THERAPY: CPT | Mod: GP

## 2023-03-12 PROCEDURE — 250N000011 HC RX IP 250 OP 636: Performed by: ORTHOPAEDIC SURGERY

## 2023-03-12 PROCEDURE — 120N000001 HC R&B MED SURG/OB

## 2023-03-12 PROCEDURE — 97535 SELF CARE MNGMENT TRAINING: CPT | Mod: GO

## 2023-03-12 PROCEDURE — 85018 HEMOGLOBIN: CPT | Performed by: INTERNAL MEDICINE

## 2023-03-12 PROCEDURE — 250N000013 HC RX MED GY IP 250 OP 250 PS 637: Performed by: NURSE PRACTITIONER

## 2023-03-12 PROCEDURE — 97530 THERAPEUTIC ACTIVITIES: CPT | Mod: GP

## 2023-03-12 RX ORDER — DEXAMETHASONE SODIUM PHOSPHATE 4 MG/ML
6 INJECTION, SOLUTION INTRA-ARTICULAR; INTRALESIONAL; INTRAMUSCULAR; INTRAVENOUS; SOFT TISSUE EVERY 6 HOURS
Status: COMPLETED | OUTPATIENT
Start: 2023-03-12 | End: 2023-03-12

## 2023-03-12 RX ADMIN — CARBIDOPA AND LEVODOPA 1 TABLET: 25; 100 TABLET ORAL at 20:37

## 2023-03-12 RX ADMIN — DEXAMETHASONE SODIUM PHOSPHATE 6 MG: 4 INJECTION, SOLUTION INTRAMUSCULAR; INTRAVENOUS at 21:27

## 2023-03-12 RX ADMIN — HYDROXYZINE HYDROCHLORIDE 10 MG: 10 TABLET ORAL at 20:37

## 2023-03-12 RX ADMIN — OXYCODONE HYDROCHLORIDE 5 MG: 5 TABLET ORAL at 04:53

## 2023-03-12 RX ADMIN — HYDROXYZINE HYDROCHLORIDE 10 MG: 10 TABLET ORAL at 08:48

## 2023-03-12 RX ADMIN — DEXAMETHASONE SODIUM PHOSPHATE 6 MG: 4 INJECTION, SOLUTION INTRAMUSCULAR; INTRAVENOUS at 14:30

## 2023-03-12 RX ADMIN — PANTOPRAZOLE SODIUM 40 MG: 40 TABLET, DELAYED RELEASE ORAL at 08:48

## 2023-03-12 RX ADMIN — DULOXETINE HYDROCHLORIDE 90 MG: 60 CAPSULE, DELAYED RELEASE ORAL at 08:47

## 2023-03-12 RX ADMIN — METHOCARBAMOL 500 MG: 500 TABLET ORAL at 23:11

## 2023-03-12 RX ADMIN — LOSARTAN POTASSIUM 12.5 MG: 25 TABLET, FILM COATED ORAL at 08:48

## 2023-03-12 RX ADMIN — BUPROPION HYDROCHLORIDE 150 MG: 150 TABLET, EXTENDED RELEASE ORAL at 08:48

## 2023-03-12 RX ADMIN — SENNOSIDES AND DOCUSATE SODIUM 2 TABLET: 50; 8.6 TABLET ORAL at 08:47

## 2023-03-12 RX ADMIN — LATANOPROST 1 DROP: 50 SOLUTION/ DROPS OPHTHALMIC at 21:43

## 2023-03-12 RX ADMIN — CARBIDOPA AND LEVODOPA 1 TABLET: 25; 100 TABLET ORAL at 14:31

## 2023-03-12 RX ADMIN — ACETAMINOPHEN 975 MG: 325 TABLET ORAL at 01:35

## 2023-03-12 RX ADMIN — CARBIDOPA AND LEVODOPA 1 TABLET: 25; 100 TABLET ORAL at 08:48

## 2023-03-12 RX ADMIN — METHOCARBAMOL 500 MG: 500 TABLET ORAL at 17:27

## 2023-03-12 RX ADMIN — HYDROXYZINE HYDROCHLORIDE 10 MG: 10 TABLET ORAL at 01:35

## 2023-03-12 RX ADMIN — METHOCARBAMOL 500 MG: 500 TABLET ORAL at 04:53

## 2023-03-12 RX ADMIN — DEXAMETHASONE SODIUM PHOSPHATE 6 MG: 4 INJECTION, SOLUTION INTRAMUSCULAR; INTRAVENOUS at 09:21

## 2023-03-12 RX ADMIN — ACETAMINOPHEN 975 MG: 325 TABLET ORAL at 09:22

## 2023-03-12 RX ADMIN — METHOCARBAMOL 500 MG: 500 TABLET ORAL at 11:29

## 2023-03-12 RX ADMIN — OXYCODONE HYDROCHLORIDE 5 MG: 5 TABLET ORAL at 11:29

## 2023-03-12 RX ADMIN — HYDROXYZINE HYDROCHLORIDE 10 MG: 10 TABLET ORAL at 14:31

## 2023-03-12 ASSESSMENT — ACTIVITIES OF DAILY LIVING (ADL)
ADLS_ACUITY_SCORE: 37
ADLS_ACUITY_SCORE: 36
ADLS_ACUITY_SCORE: 36
ADLS_ACUITY_SCORE: 37
ADLS_ACUITY_SCORE: 36
ADLS_ACUITY_SCORE: 36
ADLS_ACUITY_SCORE: 37
ADLS_ACUITY_SCORE: 36
ADLS_ACUITY_SCORE: 37
ADLS_ACUITY_SCORE: 36

## 2023-03-12 NOTE — PROGRESS NOTES
"      [facility]  Hospitalist Progress Note  Donte Renteria M.D., M.B.A.   03/12/2023    Reason for Stay/active problem list      L2-5 fusion         Assessment and Plan:        Summary of Stay:    78F with history of hypertension, Parkinson's disease, morbid obesity BMI 41, and lumbar spinal stenosis with spondylolisthesis c/b neurogenic claudication presents for scheduled lumbar fusion and now status post this procedure without immediate complications.  Hospitalist service consulted for management of chronic conditions.  No acute medical issues at this time but appears at TCU was recommended by therapies so we will discuss this with patient and social work.    Problem List with Assessment and Plan:    Lumbar Spinal Stenosis with Spondylolisthesis s/p L2-5 Fusion  -Hx of lumbar spinal stenosis with spondylolisthesis c/b neurogenic claudication   -Went for scheduled lumbar fusion with Dr. Thompson 03/09  -Pain control per primary team  -PT/OT following     Parkinson's disease  -Continue home Sinemet     Essential hypertension  -Denies lightheadedness or dizziness with ambulation   -- resumed  home losartan     Acute blood loss anemia  -Secondary to surgery  -Transfuse for hemoglobin less than 7     Morbid obesity BMI 41  -Complicates all aspects of care     DVT Prophy  -Per primary team     Disposition  -TCU in [place] likely on Monday          Interval History (Subjective):        Patient is seen and examined by me today and medical record reviewed.Overnight events noted and care discussed with nursing staff.  Patient is doing okay but has been having some left thigh pain. She was seen by spine surgery         Physical Exam:        Last Vital Signs:  /55 (BP Location: Right arm)   Pulse 80   Temp 97.1  F (36.2  C) (Temporal)   Resp 16   Ht 1.499 m (4' 11\")   Wt 93.3 kg (205 lb 11.2 oz)   SpO2 94%   BMI 41.55 kg/m      I/O last 3 completed shifts:  In: 440 [P.O.:440]  Out: 400 " [Urine:400]    Wt Readings from Last 5 Encounters:   03/09/23 93.3 kg (205 lb 11.2 oz)   12/09/21 97.6 kg (215 lb 1.6 oz)        Constitutional: Awake, alert, cooperative, no apparent distress     Respiratory: Clear to auscultation bilaterally, no crackles or wheezing   Cardiovascular: Regular rate and rhythm, normal S1 and S2, and no murmur noted   Abdomen: Normal bowel sounds, soft, non-distended, non-tender   Skin: No new rashes, no cyanosis, dry to touch   Neuro: Alert with  no new focal weakness   Extremities: No edema   Other(s):        All other systems: Negative          Medications:        All current medications were reviewed with changes reflected in problem list.         Data:      All new lab and imaging data was reviewed.      Data reviewed today: I reviewed all new labs and imaging results over the last 24 hours. I personally reviewed       Recent Labs   Lab 03/12/23  0721 03/11/23  0824 03/10/23  0728   WBC  --  10.0  --    HGB 8.5* 9.3* 9.3*   HCT  --  30.2*  --    MCV  --  92  --    PLT  --  154  --      No results for input(s): CULT in the last 168 hours.  Recent Labs   Lab 03/11/23  0824 03/10/23  0728     --    POTASSIUM 4.5  --    CHLORIDE 105  --    CO2 26  --    ANIONGAP 8  --    * 134*   BUN 15.0  --    CR 0.69  --    GFRESTIMATED 88  --    RAUL 8.5*  --        Recent Labs   Lab 03/11/23  0824 03/10/23  0728   * 134*       No results for input(s): INR in the last 168 hours.      No results for input(s): TROPONIN, TROPI, TROPR in the last 168 hours.    Invalid input(s): TROP, TROPONINIES    No results found for this or any previous visit (from the past 48 hour(s)).    COVID Status:  COVID-19 PCR Results    COVID-19 PCR Results 11/17/21 12/5/21   COVID-19 Virus by PCR (External Result) Negative    SARS CoV2 PCR  Negative      Comments are available for some flowsheets but are not being displayed.         COVID-19 Antibody Results, Testing for Immunity    COVID-19 Antibody  Results, Testing for Immunity   No data to display.              Disclaimer: This note consists of symbols derived from keyboarding, dictation and/or voice recognition software. As a result, there may be errors in the script that have gone undetected. Please consider this when interpreting information found in this chart.

## 2023-03-12 NOTE — PROGRESS NOTES
Vitals stable.   Hgb 8.5.  Was 11.9 preop.  Some left leg weakness.  PT working on her.  Await TCU placement as per .  Will Rx with IV decadron a few doses. For now.

## 2023-03-12 NOTE — PLAN OF CARE
A&Ox4; forgetful. IV SL. Lungs clear. CMS intact. Drsg on back clean and dry. Passing gas; bowel sounds active; had loose bowel movements x2 today.      Patient vital signs are at baseline: Yes  Patient able to ambulate as they were prior to admission or with assist devices provided by therapies during their stay:  No,  Reason:  depending on pt mood/confidence level and pain level pt transfers pt with Ax2 with the back brace on using the walker and gait belt, or gait belt and elida steady, or lift. Pt sat up in chair for several hours today.  Patient MUST void prior to discharge:  Yes, voiding on bedside commode.   Patient able to tolerate oral intake:  Yes, tolerating a regular diet.   Pain has adequate pain control using Oral analgesics:  Yes, taking Robaxin and atarax, oxycodone and tylenol.   Does patient have an identified :  No,  Reason:  SW working on TCU placement.  Has goal D/C date and time been discussed with patient:  No,  Reason:  SW working on TCU placement.     Plan of Care Reviewed With: patient     Overall Patient Progress: improving     Outcome Evaluation: Discharge pending based on pt progress; TCU reccomneded; SW working on placement.

## 2023-03-12 NOTE — PLAN OF CARE
Goal Outcome Evaluation:      Plan of Care Reviewed With: patient    Overall Patient Progress: no changeOverall Patient Progress: no change    Patient vital signs are at baseline: Yes  Patient able to ambulate as they were prior to admission or with assist devices provided by therapies during their stay:  No,  Reason:  A2 with lift, will need back brace OOB  Patient MUST void prior to discharge:  Yes, incontinent of bladder  Patient able to tolerate oral intake:  Yes, regular diet  Pain has adequate pain control using Oral analgesics:  Yes, PRN oxycodone and scheduled Tylenol, atarax, and robaxin  Does patient have an identified :  Yes,   Has goal D/C date and time been discussed with patient:  No,  Reason:  pending placement/PT    Pt A/O x4 but forgetful. Dressing to back CDI. Numbness to left thigh. IV SL. PT recommending TCU, discharge TBD.

## 2023-03-13 ENCOUNTER — APPOINTMENT (OUTPATIENT)
Dept: PHYSICAL THERAPY | Facility: CLINIC | Age: 78
DRG: 454 | End: 2023-03-13
Attending: ORTHOPAEDIC SURGERY
Payer: MEDICARE

## 2023-03-13 LAB — HGB BLD-MCNC: 8.6 G/DL (ref 11.7–15.7)

## 2023-03-13 PROCEDURE — 36415 COLL VENOUS BLD VENIPUNCTURE: CPT

## 2023-03-13 PROCEDURE — 99232 SBSQ HOSP IP/OBS MODERATE 35: CPT | Performed by: INTERNAL MEDICINE

## 2023-03-13 PROCEDURE — 250N000013 HC RX MED GY IP 250 OP 250 PS 637

## 2023-03-13 PROCEDURE — 85018 HEMOGLOBIN: CPT

## 2023-03-13 PROCEDURE — 120N000001 HC R&B MED SURG/OB

## 2023-03-13 PROCEDURE — 250N000013 HC RX MED GY IP 250 OP 250 PS 637: Performed by: NURSE PRACTITIONER

## 2023-03-13 PROCEDURE — 97530 THERAPEUTIC ACTIVITIES: CPT | Mod: GP

## 2023-03-13 RX ORDER — GABAPENTIN 300 MG/1
300 CAPSULE ORAL 2 TIMES DAILY
Status: DISCONTINUED | OUTPATIENT
Start: 2023-03-13 | End: 2023-03-15

## 2023-03-13 RX ADMIN — OXYCODONE HYDROCHLORIDE 5 MG: 5 TABLET ORAL at 20:50

## 2023-03-13 RX ADMIN — OXYCODONE HYDROCHLORIDE 5 MG: 5 TABLET ORAL at 09:10

## 2023-03-13 RX ADMIN — PANTOPRAZOLE SODIUM 40 MG: 40 TABLET, DELAYED RELEASE ORAL at 09:02

## 2023-03-13 RX ADMIN — LATANOPROST 1 DROP: 50 SOLUTION/ DROPS OPHTHALMIC at 20:52

## 2023-03-13 RX ADMIN — HYDROXYZINE HYDROCHLORIDE 10 MG: 10 TABLET ORAL at 02:08

## 2023-03-13 RX ADMIN — DULOXETINE HYDROCHLORIDE 90 MG: 60 CAPSULE, DELAYED RELEASE ORAL at 09:02

## 2023-03-13 RX ADMIN — METHOCARBAMOL 500 MG: 500 TABLET ORAL at 17:01

## 2023-03-13 RX ADMIN — CARBIDOPA AND LEVODOPA 1 TABLET: 25; 100 TABLET ORAL at 19:30

## 2023-03-13 RX ADMIN — METHOCARBAMOL 500 MG: 500 TABLET ORAL at 11:25

## 2023-03-13 RX ADMIN — OXYCODONE HYDROCHLORIDE 5 MG: 5 TABLET ORAL at 00:14

## 2023-03-13 RX ADMIN — HYDROXYZINE HYDROCHLORIDE 10 MG: 10 TABLET ORAL at 14:20

## 2023-03-13 RX ADMIN — GABAPENTIN 300 MG: 300 CAPSULE ORAL at 19:30

## 2023-03-13 RX ADMIN — HYDROXYZINE HYDROCHLORIDE 10 MG: 10 TABLET ORAL at 09:02

## 2023-03-13 RX ADMIN — ACETAMINOPHEN 650 MG: 325 TABLET ORAL at 00:15

## 2023-03-13 RX ADMIN — METHOCARBAMOL 500 MG: 500 TABLET ORAL at 23:57

## 2023-03-13 RX ADMIN — CARBIDOPA AND LEVODOPA 1 TABLET: 25; 100 TABLET ORAL at 14:18

## 2023-03-13 RX ADMIN — METHOCARBAMOL 500 MG: 500 TABLET ORAL at 06:10

## 2023-03-13 RX ADMIN — CARBIDOPA AND LEVODOPA 1 TABLET: 25; 100 TABLET ORAL at 09:02

## 2023-03-13 RX ADMIN — HYDROXYZINE HYDROCHLORIDE 10 MG: 10 TABLET ORAL at 19:30

## 2023-03-13 RX ADMIN — OXYCODONE HYDROCHLORIDE 5 MG: 5 TABLET ORAL at 15:57

## 2023-03-13 RX ADMIN — BUPROPION HYDROCHLORIDE 150 MG: 150 TABLET, EXTENDED RELEASE ORAL at 09:02

## 2023-03-13 RX ADMIN — LOSARTAN POTASSIUM 12.5 MG: 25 TABLET, FILM COATED ORAL at 09:02

## 2023-03-13 RX ADMIN — ACETAMINOPHEN 650 MG: 325 TABLET ORAL at 09:10

## 2023-03-13 ASSESSMENT — ACTIVITIES OF DAILY LIVING (ADL)
ADLS_ACUITY_SCORE: 33
ADLS_ACUITY_SCORE: 37
ADLS_ACUITY_SCORE: 33
ADLS_ACUITY_SCORE: 37
ADLS_ACUITY_SCORE: 33
ADLS_ACUITY_SCORE: 37

## 2023-03-13 NOTE — PROGRESS NOTES
DAILY PROGRESS NOTE    Angela Martinez is a 78 year old old female admitted on 3/9/2023.    Subjective  Patient tearful on exam. Some incisional pain. Stated she is having intermittent pain that shoots down her left leg. Also reports numbness in her left leg. When intermittent leg pain is subsided, pain is managed on current regimen.    Objective  Temp: 98.4  F (36.9  C) Temp src: Temporal BP: (!) 140/64 Pulse: 96   Resp: 16 SpO2: 97 % O2 Device: None (Room air)      A&Ox3, NAD  LLE weakness/decreased sensation  Ambulating with assistance    Hemoglobin   Date Value Ref Range Status   03/12/2023 8.5 (L) 11.7 - 15.7 g/dL Final   ]      Assessment/plan:    S/p L2-L5 OLLIF procedure  - received IV decadron  - monitor hgb  - recommendations of PT/OT - TCU  - continue PO pain medications  - out of bed with assist as tolerated      Linda Villarreal PA-C

## 2023-03-13 NOTE — PLAN OF CARE
"Care from [id number]    Blood pressure (!) 154/68, pulse 91, temperature 98.4  F (36.9  C), temperature source Temporal, resp. rate 16, height 1.499 m (4' 11\"), weight 93.3 kg (205 lb 11.2 oz), SpO2 95 %.    Admit Date: 3/9/2023  Admitting Diagnosis: lumbar spinal fusion  Neuro: Alert and Oriented x4, forgetful  Activity: are 2 assist with Elida Steady  Telemetry Monitoring: No  Pain: complaining of 8/10 pain in their back and left shin.  Tylenol, Oxycodone and robaxin given for pain.  Labs / Tests:   GI: BM on this shift, passing gas, denies nausea  : Voiding spontaneously to the bathroom with the elida steady  Fluids: is Saline locked.  Diet: Regular  Consults:   Treatment:  Discharge Disposition: to TCU in [place]    Pt slept through the night.   Calls appropriately for voiding needs.   "

## 2023-03-13 NOTE — PLAN OF CARE
Goal Outcome Evaluation:      Plan of Care Reviewed With: patient    3pm-11pm RN    Patient VS are at baseline: Yes  Patient able to ambulate as they were prior to admission or with assist devices provided by therapy during their stay: No is using the elida steady with assist of two  Patient must void prior to discharge: Yes  Patient able to tolerate oral intake: Yes  Patient has adequate pain control using oral analgesics: Yes    Patient up on the chair all afternoon through dinner, scheduled Robaxin, atarax and decadron used for pain management, dressing on back changed. Transfers using a elida steady with assist of two wearing the back brace. Plan is to discharge to TCU.

## 2023-03-13 NOTE — PLAN OF CARE
A&Ox4. IV SL. Lungs clear. CMS intact. Drsg on back CDI. Passing gas; bowel sounds active.      Patient vital signs are at baseline: Yes  Patient able to ambulate as they were prior to admission or with assist devices provided by therapies during their stay:  No,  Reason:  depending on pt mood/confidence level and pain level pt transfers pt with Ax2 with the back brace on using the walker and gait belt, or gait belt and elida steady, or lift. Pt sat up in chair for several hours today.  Patient MUST void prior to discharge:  Yes, voiding on bedside commode.   Patient able to tolerate oral intake:  Yes, tolerating a regular diet.   Pain has adequate pain control using Oral analgesics:  Yes, taking Robaxin and atarax, oxycodone and tylenol.   Does patient have an identified :  Yes  Has goal D/C date and time been discussed with patient:  Yes     Plan of Care Reviewed With: patient     Overall Patient Progress: improving     Outcome Evaluation: Pt and family anticipating discharge [facility] in [place], [place] tomorrow. Transport set up for 10:00am.

## 2023-03-13 NOTE — PROGRESS NOTES
[facility]  Hospitalist Progress Note  Donte Renteria M.D., M.B.A.   03/13/2023    Reason for Stay/active problem list      L2-5 fusion         Assessment and Plan:        Summary of Stay:    78F with history of hypertension, Parkinson's disease, morbid obesity BMI 41, and lumbar spinal stenosis with spondylolisthesis c/b neurogenic claudication presents for scheduled lumbar fusion and now status post this procedure without immediate complications.  Hospitalist service consulted for management of chronic conditions.  No acute medical issues at this time but appears at TCU was recommended by therapies so we will discuss this with patient and social work.    Problem List with Assessment and Plan:    Lumbar Spinal Stenosis with Spondylolisthesis s/p L2-5 Fusion  -Hx of lumbar spinal stenosis with spondylolisthesis c/b neurogenic claudication   -Went for scheduled lumbar fusion with Dr. Thompson 03/09  -Pain control per primary team .  Patient has left lower extremity pain shooting down from her lower back.  Primary team is aware of this and IV Decadron ordered noted.    -PT/OT following and TCU recommended.     Parkinson's disease  -Continue home Sinemet     Essential hypertension  -Denies lightheadedness or dizziness with ambulation   -- resumed  home losartan     Acute blood loss anemia  -Secondary to surgery  -Transfuse for hemoglobin less than 7     Morbid obesity BMI 41  -Complicates all aspects of care     DVT Prophy  -Per primary team     Disposition  -TCU in [place] when bed is available.           Interval History (Subjective):        Patient is seen and examined by me today and medical record reviewed.Overnight events noted and care discussed with nursing staff.  Patient feels better overall but she continues to have shooting pain down to her left thigh and knee area.  She has some numbness but able to move her extremities.  Primary team is aware of this concern and Decadron ordered as  "noted.       Physical Exam:        Last Vital Signs:  BP (!) 140/64 (BP Location: Right arm)   Pulse 96   Temp 98.4  F (36.9  C) (Temporal)   Resp 16   Ht 1.499 m (4' 11\")   Wt 93.3 kg (205 lb 11.2 oz)   SpO2 97%   BMI 41.55 kg/m      I/O last 3 completed shifts:  In: 600 [P.O.:600]  Out: -     Wt Readings from Last 5 Encounters:   03/09/23 93.3 kg (205 lb 11.2 oz)   12/09/21 97.6 kg (215 lb 1.6 oz)        Constitutional: Awake, alert, cooperative, no apparent distress     Respiratory: Clear to auscultation bilaterally, no crackles or wheezing   Cardiovascular: Regular rate and rhythm, normal S1 and S2, and no murmur noted   Abdomen: Normal bowel sounds, soft, non-distended, non-tender   Skin: No new rashes, no cyanosis, dry to touch   Neuro: Alert with  no new focal weakness   Extremities: No edema   Other(s):        All other systems: Negative          Medications:        All current medications were reviewed with changes reflected in problem list.         Data:      All new lab and imaging data was reviewed.      Data reviewed today: I reviewed all new labs and imaging results over the last 24 hours. I personally reviewed       Recent Labs   Lab 03/13/23  1307 03/12/23  0721 03/11/23  0824   WBC  --   --  10.0   HGB 8.6* 8.5* 9.3*   HCT  --   --  30.2*   MCV  --   --  92   PLT  --   --  154     No results for input(s): CULT in the last 168 hours.  Recent Labs   Lab 03/11/23  0824 03/10/23  0728     --    POTASSIUM 4.5  --    CHLORIDE 105  --    CO2 26  --    ANIONGAP 8  --    * 134*   BUN 15.0  --    CR 0.69  --    GFRESTIMATED 88  --    RAUL 8.5*  --        Recent Labs   Lab 03/11/23  0824 03/10/23  0728   * 134*       No results for input(s): INR in the last 168 hours.      No results for input(s): TROPONIN, TROPI, TROPR in the last 168 hours.    Invalid input(s): TROP, TROPONINIES    No results found for this or any previous visit (from the past 48 hour(s)).    COVID Status:  COVID-19 " PCR Results    COVID-19 PCR Results 11/17/21 12/5/21   COVID-19 Virus by PCR (External Result) Negative    SARS CoV2 PCR  Negative      Comments are available for some flowsheets but are not being displayed.         COVID-19 Antibody Results, Testing for Immunity    COVID-19 Antibody Results, Testing for Immunity   No data to display.              Disclaimer: This note consists of symbols derived from keyboarding, dictation and/or voice recognition software. As a result, there may be errors in the script that have gone undetected. Please consider this when interpreting information found in this chart.

## 2023-03-14 ENCOUNTER — APPOINTMENT (OUTPATIENT)
Dept: PHYSICAL THERAPY | Facility: CLINIC | Age: 78
DRG: 454 | End: 2023-03-14
Attending: ORTHOPAEDIC SURGERY
Payer: MEDICARE

## 2023-03-14 PROCEDURE — 250N000013 HC RX MED GY IP 250 OP 250 PS 637

## 2023-03-14 PROCEDURE — 99232 SBSQ HOSP IP/OBS MODERATE 35: CPT | Performed by: INTERNAL MEDICINE

## 2023-03-14 PROCEDURE — 97530 THERAPEUTIC ACTIVITIES: CPT | Mod: GP | Performed by: PHYSICAL THERAPIST

## 2023-03-14 PROCEDURE — 120N000001 HC R&B MED SURG/OB

## 2023-03-14 PROCEDURE — 250N000013 HC RX MED GY IP 250 OP 250 PS 637: Performed by: NURSE PRACTITIONER

## 2023-03-14 RX ORDER — POLYETHYLENE GLYCOL 3350 17 G/17G
1 POWDER, FOR SOLUTION ORAL DAILY
Qty: 7 PACKET | Refills: 0 | DISCHARGE
Start: 2023-03-14

## 2023-03-14 RX ORDER — AMOXICILLIN 250 MG
1 CAPSULE ORAL 2 TIMES DAILY
Qty: 30 TABLET | Refills: 0 | DISCHARGE
Start: 2023-03-14

## 2023-03-14 RX ORDER — AMOXICILLIN 250 MG
1-2 CAPSULE ORAL 2 TIMES DAILY
Qty: 30 TABLET | Refills: 0 | DISCHARGE
Start: 2023-03-14 | End: 2023-03-14

## 2023-03-14 RX ORDER — ONDANSETRON 4 MG/1
4 TABLET, ORALLY DISINTEGRATING ORAL EVERY 8 HOURS PRN
DISCHARGE
Start: 2023-03-14 | End: 2023-03-29

## 2023-03-14 RX ORDER — ACETAMINOPHEN 325 MG/1
650 TABLET ORAL EVERY 4 HOURS PRN
Qty: 100 TABLET | Refills: 0 | DISCHARGE
Start: 2023-03-14 | End: 2023-03-15

## 2023-03-14 RX ADMIN — BUPROPION HYDROCHLORIDE 150 MG: 150 TABLET, EXTENDED RELEASE ORAL at 08:13

## 2023-03-14 RX ADMIN — GABAPENTIN 300 MG: 300 CAPSULE ORAL at 19:43

## 2023-03-14 RX ADMIN — METHOCARBAMOL 500 MG: 500 TABLET ORAL at 23:53

## 2023-03-14 RX ADMIN — METHOCARBAMOL 500 MG: 500 TABLET ORAL at 12:05

## 2023-03-14 RX ADMIN — HYDROXYZINE HYDROCHLORIDE 10 MG: 10 TABLET ORAL at 15:07

## 2023-03-14 RX ADMIN — HYDROXYZINE HYDROCHLORIDE 10 MG: 10 TABLET ORAL at 09:45

## 2023-03-14 RX ADMIN — HYDROXYZINE HYDROCHLORIDE 10 MG: 10 TABLET ORAL at 03:32

## 2023-03-14 RX ADMIN — SENNOSIDES AND DOCUSATE SODIUM 2 TABLET: 50; 8.6 TABLET ORAL at 19:43

## 2023-03-14 RX ADMIN — OXYCODONE HYDROCHLORIDE 10 MG: 10 TABLET ORAL at 00:17

## 2023-03-14 RX ADMIN — PANTOPRAZOLE SODIUM 40 MG: 40 TABLET, DELAYED RELEASE ORAL at 08:14

## 2023-03-14 RX ADMIN — HYDROXYZINE HYDROCHLORIDE 10 MG: 10 TABLET ORAL at 21:25

## 2023-03-14 RX ADMIN — OXYCODONE HYDROCHLORIDE 10 MG: 10 TABLET ORAL at 18:47

## 2023-03-14 RX ADMIN — CARBIDOPA AND LEVODOPA 1 TABLET: 25; 100 TABLET ORAL at 08:16

## 2023-03-14 RX ADMIN — GABAPENTIN 300 MG: 300 CAPSULE ORAL at 08:14

## 2023-03-14 RX ADMIN — ACETAMINOPHEN 650 MG: 325 TABLET ORAL at 20:25

## 2023-03-14 RX ADMIN — METHOCARBAMOL 500 MG: 500 TABLET ORAL at 18:48

## 2023-03-14 RX ADMIN — OXYCODONE HYDROCHLORIDE 10 MG: 10 TABLET ORAL at 12:05

## 2023-03-14 RX ADMIN — LATANOPROST 1 DROP: 50 SOLUTION/ DROPS OPHTHALMIC at 21:26

## 2023-03-14 RX ADMIN — DULOXETINE HYDROCHLORIDE 90 MG: 60 CAPSULE, DELAYED RELEASE ORAL at 08:12

## 2023-03-14 RX ADMIN — CARBIDOPA AND LEVODOPA 1 TABLET: 25; 100 TABLET ORAL at 15:07

## 2023-03-14 RX ADMIN — METHOCARBAMOL 500 MG: 500 TABLET ORAL at 06:17

## 2023-03-14 RX ADMIN — POLYETHYLENE GLYCOL 3350 17 G: 17 POWDER, FOR SOLUTION ORAL at 08:16

## 2023-03-14 RX ADMIN — SENNOSIDES AND DOCUSATE SODIUM 2 TABLET: 50; 8.6 TABLET ORAL at 08:13

## 2023-03-14 RX ADMIN — LOSARTAN POTASSIUM 12.5 MG: 25 TABLET, FILM COATED ORAL at 08:14

## 2023-03-14 RX ADMIN — CARBIDOPA AND LEVODOPA 1 TABLET: 25; 100 TABLET ORAL at 19:43

## 2023-03-14 ASSESSMENT — ACTIVITIES OF DAILY LIVING (ADL)
ADLS_ACUITY_SCORE: 29
ADLS_ACUITY_SCORE: 29
ADLS_ACUITY_SCORE: 33
ADLS_ACUITY_SCORE: 29
ADLS_ACUITY_SCORE: 33
ADLS_ACUITY_SCORE: 29
ADLS_ACUITY_SCORE: 33
ADLS_ACUITY_SCORE: 29
ADLS_ACUITY_SCORE: 29
ADLS_ACUITY_SCORE: 33

## 2023-03-14 NOTE — PLAN OF CARE
Goal Outcome Evaluation:         Patient vital signs are at baseline: No,  Reason:  BP elevated  Patient able to ambulate as they were prior to admission or with assist devices provided by therapies during their stay:  No up with assist of two, JAMIL alonso, and elida guerrier  Patient MUST void prior to discharge:  Yes  Patient able to tolerate oral intake:  Yes  Pain has adequate pain control using Oral analgesics:  Yes  Does patient have an identified :  Yes  Has goal D/C date and time been discussed with patient:  Yes, discharging tomorrow with [facility] at 1000.

## 2023-03-14 NOTE — PROGRESS NOTES
[facility]  Hospitalist Progress Note  Donte Renteria M.D., M.B.A.   03/14/2023    Reason for Stay/active problem list      L2-5 fusion         Assessment and Plan:        Summary of Stay:    78F with history of hypertension, Parkinson's disease, morbid obesity BMI 41, and lumbar spinal stenosis with spondylolisthesis c/b neurogenic claudication presents for scheduled lumbar fusion and now status post this procedure without immediate complications.  Hospitalist service consulted for management of chronic conditions.  No acute medical issues at this time but appears at TCU was recommended by therapies so we will discuss this with patient and social work.    Problem List with Assessment and Plan:    Lumbar Spinal Stenosis with Spondylolisthesis s/p L2-5 Fusion  -Hx of lumbar spinal stenosis with spondylolisthesis c/b neurogenic claudication   -Went for scheduled lumbar fusion with Dr. Thompson 03/09  -Pain control per primary team .  Patient has left lower extremity pain shooting down from her lower back.  Primary team is aware of this and IV Decadron ordered noted.    -PT/OT following and TCU recommended.     Parkinson's disease  -Continue home Sinemet     Essential hypertension  -Denies lightheadedness or dizziness with ambulation   -- resumed  home losartan     Acute blood loss anemia  -Secondary to surgery  -Transfuse for hemoglobin less than 7     Morbid obesity BMI 41  -Complicates all aspects of care     DVT Prophy  -Per primary team     Disposition  -TCU in [place] tomorrow          Interval History (Subjective):        Patient is seen and examined by me today and medical record reviewed.Overnight events noted and care discussed with nursing staff.  Patient is feeling well with no significant symptoms today.  She continues to have generalized weakness and unable to transfer to TCU today.  Spoke with patient's  at bedside and answered her questions and concerns.  She is worried about  "her walker and whether or not she needs prescription for that.  I told her that she is going to TCU for now and may not need prescription at this point.  I also spoke with Dr. Wilcox's team regarding discharge plan.       Physical Exam:        Last Vital Signs:  BP (!) 143/66 (BP Location: Right arm)   Pulse 76   Temp 97  F (36.1  C) (Temporal)   Resp 24   Ht 1.499 m (4' 11\")   Wt 93.3 kg (205 lb 11.2 oz)   SpO2 96%   BMI 41.55 kg/m      I/O last 3 completed shifts:  In: 930 [P.O.:930]  Out: -     Wt Readings from Last 5 Encounters:   03/09/23 93.3 kg (205 lb 11.2 oz)   12/09/21 97.6 kg (215 lb 1.6 oz)        Constitutional: Awake, alert, cooperative, no apparent distress     Respiratory: Clear to auscultation bilaterally, no crackles or wheezing   Cardiovascular: Regular rate and rhythm, normal S1 and S2, and no murmur noted   Abdomen: Normal bowel sounds, soft, non-distended, non-tender   Skin: No new rashes, no cyanosis, dry to touch   Neuro: Alert with  no new focal weakness   Extremities: No edema   Other(s):        All other systems: Negative          Medications:        All current medications were reviewed with changes reflected in problem list.         Data:      All new lab and imaging data was reviewed.      Data reviewed today: I reviewed all new labs and imaging results over the last 24 hours. I personally reviewed       Recent Labs   Lab 03/13/23  1307 03/12/23  0721 03/11/23 0824   WBC  --   --  10.0   HGB 8.6* 8.5* 9.3*   HCT  --   --  30.2*   MCV  --   --  92   PLT  --   --  154     No results for input(s): CULT in the last 168 hours.  Recent Labs   Lab 03/11/23  0824 03/10/23  0728     --    POTASSIUM 4.5  --    CHLORIDE 105  --    CO2 26  --    ANIONGAP 8  --    * 134*   BUN 15.0  --    CR 0.69  --    GFRESTIMATED 88  --    RAUL 8.5*  --        Recent Labs   Lab 03/11/23  0824 03/10/23  0728   * 134*       No results for input(s): INR in the last 168 hours.      No " results for input(s): TROPONIN, TROPI, TROPR in the last 168 hours.    Invalid input(s): TROP, TROPONINIES    No results found for this or any previous visit (from the past 48 hour(s)).    COVID Status:  COVID-19 PCR Results    COVID-19 PCR Results 11/17/21 12/5/21   COVID-19 Virus by PCR (External Result) Negative    SARS CoV2 PCR  Negative      Comments are available for some flowsheets but are not being displayed.         COVID-19 Antibody Results, Testing for Immunity    COVID-19 Antibody Results, Testing for Immunity   No data to display.              Disclaimer: This note consists of symbols derived from keyboarding, dictation and/or voice recognition software. As a result, there may be errors in the script that have gone undetected. Please consider this when interpreting information found in this chart.

## 2023-03-14 NOTE — PLAN OF CARE
Patient vital signs are at baseline: Yes  Patient able to ambulate as they were prior to admission or with assist devices provided by therapies during their stay:  No,  Reason:  uses elida steady assist x2 with GB and back brace   Patient MUST void prior to discharge:  Yes in the bathroom   Patient able to tolerate oral intake:  Yes on regular diet; takes PO meds ok   Pain has adequate pain control using Oral analgesics:  Yes moderate to severe pain is controlled with scheduled PO Robaxin and PRN PO Oxycodone   Does patient have an identified :  Yes , spent the night by the pt's bedside, will be driving the pt to a TCU in [place] on 3/14 at 10 am  Has goal D/C date and time been discussed with patient:  Yes TCU in [place] on 3/14 at 10 am    Pt is A&O x4. VSS. On RA. IV SL. Dressing CDI. Sleeps between cares.

## 2023-03-14 NOTE — PROGRESS NOTES
DAILY PROGRESS NOTE    Angela Martinez is a 78 year old old female admitted on 3/9/2023.    Subjective  Patient reported intermittent left leg pain. Also reported left leg weakness. Noted bruising to her left low back / hip area.    Objective  Temp: 97  F (36.1  C) Temp src: Temporal BP: (!) 143/66 Pulse: 76   Resp: 24 SpO2: 96 % O2 Device: None (Room air)      A&Ox3, NAD  LLE weakness specifically hip flexion and knee extension. Normal sensation bilateral LE.  ecchymosis to left low back / hip  Ambulating with assistance      Hemoglobin   Date Value Ref Range Status   03/13/2023 8.6 (L) 11.7 - 15.7 g/dL Final   ]      Assessment/Plan:    S/p L2-L5 OLLIF procedure  - received IV decadron for LLE  - recommendations of PT/OT were TCU. Has placement.  - Patient concerned about having a walker.Spoke with . TCU will provide walker.  - continue PO pain medications  - out of bed with assist as tolerated  - PT/OT      Linda Villarreal PA-C

## 2023-03-14 NOTE — PROGRESS NOTES
VSS, pt on room air. Alert and oriented x 4, forgetful at times. Up x 1 to recliner with assist of 2 and walker, gait belt. Otherwise up x 2 with elida steady. PIV SL. Pt required lots of encouragement. Received PRN oxycodone x 2 for back/ left hip pain which was effective. L hip bruised. Dressing CDI. Planned discharge tomorrow.

## 2023-03-14 NOTE — PROGRESS NOTES
Care Management Discharge Note    Discharge Date: 03/14/2023       Discharge Disposition: Transitional Care    Discharge Services:      Discharge DME:      Discharge Transportation:     PAS Confirmation Code: [place] TCU will do per Griselda Carrero RN   Patient/family educated on Medicare website which has current facility and service quality ratings:  yes    Education Provided on the Discharge Plan:  yes  Persons Notified of Discharge Plans: patient, staff RN, TCU  Patient/Family in Agreement with the Plan: yes     Handoff Referral Completed: No    Additional Information:  Patient to be discharged today to [facility] in [place], [place]. Paged MD for orders and they were faxed to [phone]. A request ws made for a walker. Consulted with PT and the TCU will provide at discharge if needed by then. Patient will be transported by .    Addendum 101-   Case Management received call from Magan Villalobos RN at [facility], that oxycodone was ordered for PRN pain and that patient has an allergy to it.  Notified SVETLANA Cain and was informed patient's allergy is GI upset and that she has been tolerating it well in the hospital.  Patient also has prn zofran ordered.  LM for Griselda at [facility] of the above.     Addendum 1342-  Orders were clarified this AM with PA and re faxed over to [facility]. Patient unable to transfer to car.  Transportation arranged for tomorrow via  [employer] w/c ride at 10 AM.PA notified to re sign orders with tomorrows date.        Kristan Mejia, RN, BSN, CM  Inpatient Care Coordination  [facility]  [phone]

## 2023-03-15 VITALS
RESPIRATION RATE: 18 BRPM | OXYGEN SATURATION: 95 % | TEMPERATURE: 98 F | SYSTOLIC BLOOD PRESSURE: 120 MMHG | HEART RATE: 84 BPM | DIASTOLIC BLOOD PRESSURE: 47 MMHG | WEIGHT: 205.7 LBS | HEIGHT: 59 IN | BODY MASS INDEX: 41.47 KG/M2

## 2023-03-15 PROCEDURE — 250N000013 HC RX MED GY IP 250 OP 250 PS 637: Performed by: NURSE PRACTITIONER

## 2023-03-15 PROCEDURE — 99232 SBSQ HOSP IP/OBS MODERATE 35: CPT | Performed by: INTERNAL MEDICINE

## 2023-03-15 PROCEDURE — 250N000013 HC RX MED GY IP 250 OP 250 PS 637

## 2023-03-15 RX ORDER — GABAPENTIN 300 MG/1
300 CAPSULE ORAL 3 TIMES DAILY
Status: DISCONTINUED | OUTPATIENT
Start: 2023-03-15 | End: 2023-03-15 | Stop reason: HOSPADM

## 2023-03-15 RX ORDER — HYDROXYZINE HYDROCHLORIDE 10 MG/1
10 TABLET, FILM COATED ORAL EVERY 6 HOURS PRN
DISCHARGE
Start: 2023-03-15

## 2023-03-15 RX ORDER — GABAPENTIN 300 MG/1
300 CAPSULE ORAL 3 TIMES DAILY
Qty: 90 CAPSULE | Refills: 0 | Status: SHIPPED | OUTPATIENT
Start: 2023-03-15

## 2023-03-15 RX ADMIN — METHOCARBAMOL 500 MG: 500 TABLET ORAL at 05:41

## 2023-03-15 RX ADMIN — ACETAMINOPHEN 650 MG: 325 TABLET ORAL at 09:06

## 2023-03-15 RX ADMIN — CARBIDOPA AND LEVODOPA 1 TABLET: 25; 100 TABLET ORAL at 08:19

## 2023-03-15 RX ADMIN — BUPROPION HYDROCHLORIDE 150 MG: 150 TABLET, EXTENDED RELEASE ORAL at 08:18

## 2023-03-15 RX ADMIN — GABAPENTIN 300 MG: 300 CAPSULE ORAL at 08:19

## 2023-03-15 RX ADMIN — OXYCODONE HYDROCHLORIDE 10 MG: 10 TABLET ORAL at 05:40

## 2023-03-15 RX ADMIN — LOSARTAN POTASSIUM 12.5 MG: 25 TABLET, FILM COATED ORAL at 08:19

## 2023-03-15 RX ADMIN — POLYETHYLENE GLYCOL 3350 17 G: 17 POWDER, FOR SOLUTION ORAL at 08:20

## 2023-03-15 RX ADMIN — OXYCODONE HYDROCHLORIDE 10 MG: 10 TABLET ORAL at 09:06

## 2023-03-15 RX ADMIN — HYDROXYZINE HYDROCHLORIDE 10 MG: 10 TABLET ORAL at 03:50

## 2023-03-15 RX ADMIN — PANTOPRAZOLE SODIUM 40 MG: 40 TABLET, DELAYED RELEASE ORAL at 08:17

## 2023-03-15 RX ADMIN — HYDROXYZINE HYDROCHLORIDE 10 MG: 10 TABLET ORAL at 09:36

## 2023-03-15 RX ADMIN — DULOXETINE HYDROCHLORIDE 90 MG: 60 CAPSULE, DELAYED RELEASE ORAL at 08:17

## 2023-03-15 RX ADMIN — SENNOSIDES AND DOCUSATE SODIUM 2 TABLET: 50; 8.6 TABLET ORAL at 08:19

## 2023-03-15 ASSESSMENT — ACTIVITIES OF DAILY LIVING (ADL)
ADLS_ACUITY_SCORE: 29
ADLS_ACUITY_SCORE: 30
ADLS_ACUITY_SCORE: 29

## 2023-03-15 NOTE — DISCHARGE INSTRUCTIONS
Recommendations for Back Brace use per MD:  Use brace at all times when out of bed. May remove when lying down.

## 2023-03-15 NOTE — PROGRESS NOTES
[facility]    Medicine Progress Note - Hospitalist Service    Date of Admission:  3/9/2023    Assessment & Plan     Angela Martinez is a 78 year old female with history of hypertension, Parkinson's disease, severe obesity (BMI 41), and lumbar spinal stenosis with spondylolisthesis c/b neurogenic claudication. She presented to [facility] on 3/9/23 for scheduled lumbar fusion (L2-L5). Surgery was uncomplicated. The hospitalist service was consulted for management of chronic conditions.  After a few days in the hospital patient developed pain radiating from left buttock down her leg. She was followed by PT and TCU on discharge was recommended and arranged for 3/15/23.     Problem List:     S/p L2-L5 fusion on 3/8/23  Lumbar Spinal Stenosis with Spondylolisthesis   Sciatica type pain post operatively  -Hx of lumbar spinal stenosis with spondylolisthesis c/b neurogenic claudication   -Had lumbar fusion with Dr. Thompson 03/09  -Pain control per primary team .  Patient has had left lower extremity pain shooting down from her lower back for which neurosurgery ordered IV Decadron.  -PT/OT following and TCU recommended. Discharge today.  -Increase Neurontin to 300 mg TID and continue on discharge  -Change hydroxyzine to prn on discharge  -Tylenol and oxycodone on discharge per spine surgery  -Continue lumbar brace on discharge     Parkinson's disease  -Continue PTA Sinemet     Essential hypertension  -Continue PTA losartan     Acute blood loss anemia  -Secondary to surgical and post op blood loss  -Preop HGB was 11.8 and got to as low as 8.5.   -Stable  -No transfusion needed     Severe obesity with BMI 41       Diet: Advance Diet as Tolerated: Regular Diet Adult  Discharge Instruction - Regular Diet Adult  Diet    DVT Prophylaxis: Pneumatic Compression Devices  Benedict Catheter: Not present  Lines: None     Cardiac Monitoring: None  Code Status: Full Code      Clinically Significant Risk Factors                 "        # Severe Obesity: Estimated body mass index is 41.55 kg/m  as calculated from the following:    Height as of this encounter: 1.499 m (4' 11\").    Weight as of this encounter: 93.3 kg (205 lb 11.2 oz).          Disposition Plan      Expected Discharge Date: 03/15/2023    Discharge Delays: Placement - TCU    Discharge Comments: TCU 10:00          Rocky Godinez MD  Hospitalist Service  [facility]  Securely message with [employer] (more info)  Text page via [employer] Paging/Directory   ______________________________________________________________________    Interval History   No new problems. Still having burning pain in left buttocks radiating down left leg. No excessive somnolence or confusion.     Physical Exam   Vital Signs: Temp: 98.3  F (36.8  C) Temp src: Temporal BP: 134/58 Pulse: 84   Resp: 18 SpO2: 98 % O2 Device: None (Room air)    Weight: 205 lbs 11.2 oz    GENERAL:  Comfortable. Cooperative.  PSYCH: pleasant, oriented, No acute distress.  EYES: PERRLA, Normal conjunctiva.  HEART:  Regular rate and rhythm. No JVD. Pulses normal. No edema.  LUNGS:  Clear to auscultation, normal Respiratory effort.  ABDOMEN:  Soft, no hepatosplenomegaly, normal bowel sounds.  EXTREMETIES: No clubbing, cyanosis or ischemia  SKIN:  Dry to touch, No rash.      Medical Decision Making       45 MINUTES SPENT BY ME on the date of service doing chart review, history, exam, documentation & further activities per the note.      Data         Imaging results reviewed over the past 24 hrs:   No results found for this or any previous visit (from the past 24 hour(s)).  Recent Labs   Lab 03/13/23  1307 03/12/23  0721 03/11/23  0824 03/10/23  0728   WBC  --   --  10.0  --    HGB 8.6* 8.5* 9.3* 9.3*   MCV  --   --  92  --    PLT  --   --  154  --    NA  --   --  139  --    POTASSIUM  --   --  4.5  --    CHLORIDE  --   --  105  --    CO2  --   --  26  --    BUN  --   --  15.0  --    CR  --   --  0.69  --    ANIONGAP "  --   --  8  --    RAUL  --   --  8.5*  --    GLC  --   --  134* 134*

## 2023-03-15 NOTE — PROGRESS NOTES
Care Management Discharge Note    Discharge Date: 03/15/2023       Discharge Disposition: Transitional Care    Discharge Services:  rehab      Discharge Transportation:  "[employer] w/c    Private pay costs discussed: transportation costs    Education Provided on the Discharge Plan: yes   Persons Notified of Discharge Plans: patient,  , stff and charge RN  Patient/Family in Agreement with the Plan:  yes    Handoff Referral Completed: No    Additional Information:  Patient discharging today at 1000 via [facility] w/c. Costs reviewed with patient and  yesterday. Orders were faxed to [facility].     Kristan Mejia RN, BSN, CM  Inpatient Care Coordination  [facility]  [phone]          Mely Mejia RN

## 2023-03-15 NOTE — PLAN OF CARE
Physical Therapy Discharge Summary    Reason for therapy discharge:    Discharged to transitional care facility.    Progress towards therapy goal(s). See goals on Care Plan in [facility] electronic health record for goal details.  Goals not met.  Barriers to achieving goals:   discharge from facility.    Therapy recommendation(s):    Continued therapy is recommended.  Rationale/Recommendations:  PT as indicated at TCU.      Note: Pt not seen by documenting PT on this date. Information obtained from chart review.

## 2023-03-15 NOTE — PLAN OF CARE
Goal Outcome Evaluation:  .Patient vital signs are at baseline: Yes  Patient able to ambulate as they were prior to admission or with assist devices provided by therapies during their stay:  No,  Reason: elida steady  Patient MUST void prior to discharge:  Yes  Patient able to tolerate oral intake:  Yes  Pain has adequate pain control using Oral analgesics:  Yes  Does patient have an identified :  Yes  Has goal D/C date and time been discussed with patient:  No,  Reason:  going to [place]  with the  to [facility]

## 2023-03-15 NOTE — PLAN OF CARE
"Occupational Therapy Discharge Summary    Reason for therapy discharge:    Discharged to transitional care facility.    Progress towards therapy goal(s). See goals on Care Plan in [facility] electronic health record for goal details.  Goals not met.  Barriers to achieving goals:   discharge from facility.    Therapy recommendation(s):    Continued therapy is recommended.  Rationale/Recommendations:  Per treating OT \"Pt presents significantly below baseline with ADLs and mobility requiring TCU to return to prior level of function\".    **This patient was not seen by writing OT, information for discharge summary taken from treating OT's notes.**    "

## 2023-03-15 NOTE — PROGRESS NOTES
Patient vital signs are at baseline: Yes  Patient able to ambulate as they were prior to admission or with assist devices provided by therapies during their stay:  No,  Reason:  A2ss  Patient MUST void prior to discharge:  Yes  Patient able to tolerate oral intake:  Yes  Pain has adequate pain control using Oral analgesics:  Yes  Does patient have an identified :  Yes  Has goal D/C date and time been discussed with patient:  Yes    Assumed cares from 1900 to 2300. Patient aox4. VSS. Patient was discharged today, but transport did not pick her up. Will now discharge tomorrow at 1000. Pain slightly improved with oxycodone. A2ss. Voids in bathroom. Patient weepy at slow progress after surgery. RN allowed patient to vent frustration.

## 2023-03-15 NOTE — PLAN OF CARE
Goal Outcome Evaluation:Assumed pt care [id number].  VSS. Afebrile and RA sats mid 90's.  Pain in back and L/E's, rates 8/10, given Oxycodone, results pending.  Up to BR with assist of 2 and Ho.  POC reviewed with pt and , questions answered.  Plan for discharge to TCU [facility] at 1000.

## 2023-03-20 ENCOUNTER — TELEPHONE (OUTPATIENT)
Dept: ORTHOPEDICS | Facility: CLINIC | Age: 78
End: 2023-03-20
Payer: COMMERCIAL

## 2023-03-20 NOTE — TELEPHONE ENCOUNTER
It does not appear Dr. Evin Thompson is with [facility]. Dr. Edgar Thompson is no longer with [facility].     Phone call to [facility] and they were informed of the above . Recommended they contact Evin Thompson's office that is outside of [facility].     EBONY Latham RN

## 2023-03-20 NOTE — TELEPHONE ENCOUNTER
Griselda from patient's TCU - [facility] called [facility] to request a call from patient's care team regarding medication post surgery. Writer is unsure what clinic so will route to Orthopedics as her surgery was done by Dr. Evin Thompson. Please call Griselda at [phone]. Thank you~

## 2025-02-06 NOTE — PROGRESS NOTES
Care Management Follow Up    Length of Stay (days): 4    Expected Discharge Date: 03/13/2023     Concerns to be Addressed:       Patient plan of care discussed at interdisciplinary rounds: Yes    Anticipated Discharge Disposition: Transitional Care     Anticipated Discharge Services: rehab       Additional Information:  Case Management left a message with [facility] to determine if have a bed.    Addendum: Patient has been accepted and chooses to go to [facility] in [facility].   to transport. Plan is for patient to arrive at 1100. PAS is completed by the facility per Griselda, Director of Nursing, [facility].  Patient's RN today to page MD to sign orders for 3/14- day of discharge.    Kristan Mejia, RN, BSN, CM  Inpatient Care Coordination  [facility]  [phone]     Relaxed Relaxed

## (undated) DEVICE — TOOL DISSECT MIDAS MR8 14CM MATCH HEAD 3MM MR8-14MH30

## (undated) DEVICE — DRSG ADAPTIC 3X3" 6112

## (undated) DEVICE — DRSG DRAIN 4X4" 7086

## (undated) DEVICE — BONE WAX 2.5GM W31G

## (undated) DEVICE — DRSG STERI STRIP 1/2X4" R1547

## (undated) DEVICE — KIT DRAIN CLOSED WOUND SUCTION MED 400ML RESVR

## (undated) DEVICE — BAG CLEAR TRASH 1.3M 39X33" P4040C

## (undated) DEVICE — CATH IV ANGIO INTRO 14GA X 1 3/4" 381467

## (undated) DEVICE — FLEXIBLE CURETTE BLADE

## (undated) DEVICE — DRSG ADAPTIC 3X8" 6113

## (undated) DEVICE — POSITIONER ARM CRADLE LAMINECTOMY DISP

## (undated) DEVICE — DRAPE IOBAN ISOLATION VERTICAL 6619

## (undated) DEVICE — SUTURE VICRYL+ 3-0 18IN PS-2 UND VCP497H

## (undated) DEVICE — CATH TRAY FOLEY SURESTEP 16FR W/URINE MTR STATLK LF A303416A

## (undated) DEVICE — DISPOSABLE MONOPOLAR PROBE

## (undated) DEVICE — DRSG ABD TNDRSRB WET PRUF 8IN X 10IN STRL  9194A

## (undated) DEVICE — IOM FLAT FEE

## (undated) DEVICE — LINEN ORTHO ACL PACK 5447

## (undated) DEVICE — CUSTOM PACK LUMBAR FUSION SNE5BLFHEA

## (undated) DEVICE — ESU GROUND PAD ADULT W/CORD E7507

## (undated) DEVICE — PLATE GROUNDING ADULT W/CORD 9165L

## (undated) DEVICE — CUSHION INSERT LG PRONE VIEW JACKSON TABLE

## (undated) DEVICE — DRSG GAUZE 4X4" TRAY

## (undated) DEVICE — 10G BONE ACCESS TOOLS/KIT

## (undated) DEVICE — DRAPE SHEET REV FOLD 3/4 9349

## (undated) DEVICE — GLOVE UNDER INDICATOR PI SZ 7.0 LF 41670

## (undated) DEVICE — DRAPE C-ARM 60X42" 1013

## (undated) DEVICE — GLOVE BIOGEL PI SZ 8.0 40880

## (undated) DEVICE — SUTURE VICRYL+ 2-0 CT-2 27" UND VCP269H

## (undated) DEVICE — LINEN DRAPE 54X72" 5467

## (undated) DEVICE — GLOVE BIOGEL PI INDICATOR 8.0 LF 41680

## (undated) DEVICE — SOL NACL 0.9% IRRIG 1000ML BOTTLE 2F7124

## (undated) DEVICE — SOL WATER IRRIG 1000ML BOTTLE 2F7114

## (undated) DEVICE — SPONGE RAY-TEC 4X8" 7318

## (undated) DEVICE — DRAPE MAYO STAND 23X54 8337

## (undated) DEVICE — PREP PAD ALCOHOL 2PLY MED STRL APP102A

## (undated) DEVICE — DRSG GAUZE 4X4" 3033

## (undated) DEVICE — SPONGE NEURO 1/2X1/2 WECK 200100

## (undated) DEVICE — GLOVE SURG PI ULTRA TOUCH M SZ 7 LF 42670

## (undated) DEVICE — PREP DURAPREP 26ML APL 8630

## (undated) DEVICE — DRSG TEGADERM 4X4 3/4" 1626W

## (undated) DEVICE — WRAP LUMBAR COMPRESS COLD THERAPY 4632P

## (undated) DEVICE — RX SURGIFLO HEMOSTATIC MATRIX 8ML 2991

## (undated) DEVICE — SUCTION MANIFOLD NEPTUNE 2 SYS 4 PORT 0702-020-000

## (undated) DEVICE — K WIRE

## (undated) DEVICE — PACK SMALL SPINE RIDGES

## (undated) DEVICE — GLOVE BIOGEL PI MICRO INDICATOR UNDERGLOVE SZ 8.0 48980

## (undated) DEVICE — NEEDLE SPINAL DISP 18X3 QUINCKE BD 5174

## (undated) DEVICE — SU VICRYL+ 0 27IN CT-2 VLT VCP334H

## (undated) DEVICE — DRSG ABDOMINAL 07 1/2X8" 7197D

## (undated) DEVICE — GLOVE SURG PI ULTRA TOUCH M SZ 8 LF

## (undated) DEVICE — SYR 10ML LL W/O NDL

## (undated) DEVICE — Device

## (undated) DEVICE — SU VICRYL 2-0 CT-2 27" UND J269H

## (undated) RX ORDER — PROPOFOL 10 MG/ML
INJECTION, EMULSION INTRAVENOUS
Status: DISPENSED
Start: 2023-03-09

## (undated) RX ORDER — KETOROLAC TROMETHAMINE 30 MG/ML
INJECTION, SOLUTION INTRAMUSCULAR; INTRAVENOUS
Status: DISPENSED
Start: 2023-03-09

## (undated) RX ORDER — PHENYLEPHRINE HYDROCHLORIDE 10 MG/ML
INJECTION INTRAVENOUS
Status: DISPENSED
Start: 2023-03-09

## (undated) RX ORDER — METHADONE HYDROCHLORIDE 10 MG/ML
INJECTION, SOLUTION INTRAMUSCULAR; INTRAVENOUS; SUBCUTANEOUS
Status: DISPENSED
Start: 2023-03-09

## (undated) RX ORDER — METOPROLOL TARTRATE 1 MG/ML
INJECTION, SOLUTION INTRAVENOUS
Status: DISPENSED
Start: 2023-03-09

## (undated) RX ORDER — ONDANSETRON 2 MG/ML
INJECTION INTRAMUSCULAR; INTRAVENOUS
Status: DISPENSED
Start: 2023-03-09

## (undated) RX ORDER — GABAPENTIN 100 MG/1
CAPSULE ORAL
Status: DISPENSED
Start: 2023-03-09

## (undated) RX ORDER — KETOROLAC TROMETHAMINE 15 MG/ML
INJECTION, SOLUTION INTRAMUSCULAR; INTRAVENOUS
Status: DISPENSED
Start: 2023-03-09

## (undated) RX ORDER — GLYCOPYRROLATE 0.2 MG/ML
INJECTION INTRAMUSCULAR; INTRAVENOUS
Status: DISPENSED
Start: 2023-03-09

## (undated) RX ORDER — OXYCODONE HYDROCHLORIDE 5 MG/1
TABLET ORAL
Status: DISPENSED
Start: 2023-03-09

## (undated) RX ORDER — HYDROXYZINE HYDROCHLORIDE 10 MG/1
TABLET, FILM COATED ORAL
Status: DISPENSED
Start: 2023-03-09

## (undated) RX ORDER — METHOCARBAMOL 500 MG/1
TABLET, FILM COATED ORAL
Status: DISPENSED
Start: 2023-03-09

## (undated) RX ORDER — TRANEXAMIC ACID 10 MG/ML
INJECTION, SOLUTION INTRAVENOUS
Status: DISPENSED
Start: 2023-03-09

## (undated) RX ORDER — ACETAMINOPHEN 325 MG/1
TABLET ORAL
Status: DISPENSED
Start: 2023-03-09

## (undated) RX ORDER — BUPIVACAINE HYDROCHLORIDE 2.5 MG/ML
INJECTION, SOLUTION EPIDURAL; INFILTRATION; INTRACAUDAL
Status: DISPENSED
Start: 2023-03-09

## (undated) RX ORDER — FENTANYL CITRATE-0.9 % NACL/PF 10 MCG/ML
PLASTIC BAG, INJECTION (ML) INTRAVENOUS
Status: DISPENSED
Start: 2023-03-09

## (undated) RX ORDER — LIDOCAINE HYDROCHLORIDE 10 MG/ML
INJECTION, SOLUTION EPIDURAL; INFILTRATION; INTRACAUDAL; PERINEURAL
Status: DISPENSED
Start: 2023-03-09

## (undated) RX ORDER — DEXAMETHASONE SODIUM PHOSPHATE 4 MG/ML
INJECTION, SOLUTION INTRA-ARTICULAR; INTRALESIONAL; INTRAMUSCULAR; INTRAVENOUS; SOFT TISSUE
Status: DISPENSED
Start: 2023-03-09

## (undated) RX ORDER — MAGNESIUM SULFATE HEPTAHYDRATE 40 MG/ML
INJECTION, SOLUTION INTRAVENOUS
Status: DISPENSED
Start: 2023-03-09

## (undated) RX ORDER — CEFAZOLIN SODIUM/WATER 2 G/20 ML
SYRINGE (ML) INTRAVENOUS
Status: DISPENSED
Start: 2023-03-09